# Patient Record
Sex: MALE | Race: WHITE | Employment: OTHER | URBAN - METROPOLITAN AREA
[De-identification: names, ages, dates, MRNs, and addresses within clinical notes are randomized per-mention and may not be internally consistent; named-entity substitution may affect disease eponyms.]

---

## 2021-07-23 ENCOUNTER — HOSPITAL ENCOUNTER (OUTPATIENT)
Age: 76
Setting detail: OBSERVATION
Discharge: HOME | End: 2021-07-24
Attending: EMERGENCY MEDICINE | Admitting: STUDENT IN AN ORGANIZED HEALTH CARE EDUCATION/TRAINING PROGRAM
Payer: MEDICARE

## 2021-07-23 ENCOUNTER — APPOINTMENT (OUTPATIENT)
Dept: GENERAL RADIOLOGY | Age: 76
End: 2021-07-23
Attending: STUDENT IN AN ORGANIZED HEALTH CARE EDUCATION/TRAINING PROGRAM
Payer: MEDICARE

## 2021-07-23 ENCOUNTER — APPOINTMENT (OUTPATIENT)
Dept: NON INVASIVE DIAGNOSTICS | Age: 76
End: 2021-07-23
Attending: STUDENT IN AN ORGANIZED HEALTH CARE EDUCATION/TRAINING PROGRAM
Payer: MEDICARE

## 2021-07-23 DIAGNOSIS — I21.09 ST ELEVATION MYOCARDIAL INFARCTION (STEMI) OF ANTERIOR WALL (HCC): Primary | ICD-10-CM

## 2021-07-23 DIAGNOSIS — I24.9 ACUTE CORONARY SYNDROME (HCC): ICD-10-CM

## 2021-07-23 DIAGNOSIS — I21.3 STEMI (ST ELEVATION MYOCARDIAL INFARCTION) (HCC): ICD-10-CM

## 2021-07-23 LAB
ACT BLD: 230 SECS (ref 79–138)
ACT BLD: 241 SECS (ref 79–138)
ACT BLD: 290 SECS (ref 79–138)
ACT BLD: 329 SECS (ref 79–138)
ALBUMIN SERPL-MCNC: 3.9 G/DL (ref 3.5–5)
ALBUMIN/GLOB SERPL: 1.3 {RATIO} (ref 1.1–2.2)
ALP SERPL-CCNC: 52 U/L (ref 45–117)
ALT SERPL-CCNC: 41 U/L (ref 12–78)
ANION GAP SERPL CALC-SCNC: 6 MMOL/L (ref 5–15)
AST SERPL-CCNC: 31 U/L (ref 15–37)
BASOPHILS # BLD: 0.1 K/UL (ref 0–0.1)
BASOPHILS NFR BLD: 1 % (ref 0–1)
BILIRUB DIRECT SERPL-MCNC: 0.1 MG/DL (ref 0–0.2)
BILIRUB SERPL-MCNC: 0.5 MG/DL (ref 0.2–1)
BUN SERPL-MCNC: 15 MG/DL (ref 6–20)
BUN/CREAT SERPL: 17 (ref 12–20)
CALCIUM SERPL-MCNC: 8.7 MG/DL (ref 8.5–10.1)
CHLORIDE SERPL-SCNC: 107 MMOL/L (ref 97–108)
CHOLEST SERPL-MCNC: 189 MG/DL
CO2 SERPL-SCNC: 27 MMOL/L (ref 21–32)
COMMENT, HOLDF: NORMAL
COMMENT, HOLDF: NORMAL
CREAT SERPL-MCNC: 0.86 MG/DL (ref 0.7–1.3)
DIFFERENTIAL METHOD BLD: ABNORMAL
ECHO AO ROOT DIAM: 3.77 CM
ECHO AR MAX VEL PISA: 293.16 CENTIMETER/SECOND
ECHO AV AREA PEAK VELOCITY: 2.15 CM2
ECHO AV AREA/BSA PEAK VELOCITY: 1 CM2/M2
ECHO AV PEAK GRADIENT: 8.24 MMHG
ECHO AV PEAK VELOCITY: 143.53 CM/S
ECHO AV REGURGITANT PHT: 658.7 MS
ECHO EST RA PRESSURE: 3 MMHG
ECHO LA AREA 4C: 15.19 CM2
ECHO LA MAJOR AXIS: 2.94 CM
ECHO LA MINOR AXIS: 1.43 CM
ECHO LA VOL 2C: 50.83 ML (ref 18–58)
ECHO LA VOL 4C: 34.17 ML (ref 18–58)
ECHO LA VOL BP: 45.4 ML (ref 18–58)
ECHO LA VOL/BSA BIPLANE: 22.04 ML/M2 (ref 16–28)
ECHO LA VOLUME INDEX A2C: 24.67 ML/M2 (ref 16–28)
ECHO LA VOLUME INDEX A4C: 16.59 ML/M2 (ref 16–28)
ECHO LV E' LATERAL VELOCITY: 8.78 CENTIMETER/SECOND
ECHO LV E' SEPTAL VELOCITY: 6.49 CENTIMETER/SECOND
ECHO LV INTERNAL DIMENSION DIASTOLIC: 4.72 CM (ref 4.2–5.9)
ECHO LV INTERNAL DIMENSION SYSTOLIC: 3.6 CM
ECHO LV IVSD: 1.02 CM (ref 0.6–1)
ECHO LV MASS 2D: 155.6 G (ref 88–224)
ECHO LV MASS INDEX 2D: 75.5 G/M2 (ref 49–115)
ECHO LV POSTERIOR WALL DIASTOLIC: 0.89 CM (ref 0.6–1)
ECHO LVOT DIAM: 2.08 CM
ECHO LVOT PEAK GRADIENT: 3.34 MMHG
ECHO LVOT PEAK VELOCITY: 91.33 CM/S
ECHO MV A VELOCITY: 64.07 CENTIMETER/SECOND
ECHO MV AREA PHT: 2.37 CM2
ECHO MV E DECELERATION TIME (DT): 320.56 MS
ECHO MV E VELOCITY: 56.94 CENTIMETER/SECOND
ECHO MV PRESSURE HALF TIME (PHT): 92.96 MS
ECHO PV PEAK INSTANTANEOUS GRADIENT SYSTOLIC: 1.55 MMHG
ECHO RIGHT VENTRICULAR SYSTOLIC PRESSURE (RVSP): 32.53 MMHG
ECHO RV INTERNAL DIMENSION: 3.84 CM
ECHO RV TAPSE: 1.68 CM (ref 1.5–2)
ECHO TV REGURGITANT MAX VELOCITY: 271.72 CM/S
ECHO TV REGURGITANT PEAK GRADIENT: 29.53 MMHG
EOSINOPHIL # BLD: 0.1 K/UL (ref 0–0.4)
EOSINOPHIL NFR BLD: 1 % (ref 0–7)
ERYTHROCYTE [DISTWIDTH] IN BLOOD BY AUTOMATED COUNT: 13 % (ref 11.5–14.5)
EST. AVERAGE GLUCOSE BLD GHB EST-MCNC: 126 MG/DL
GLOBULIN SER CALC-MCNC: 3 G/DL (ref 2–4)
GLUCOSE SERPL-MCNC: 124 MG/DL (ref 65–100)
HBA1C MFR BLD: 6 % (ref 4–5.6)
HCT VFR BLD AUTO: 45.6 % (ref 36.6–50.3)
HDLC SERPL-MCNC: 54 MG/DL
HDLC SERPL: 3.5 {RATIO} (ref 0–5)
HGB BLD-MCNC: 15.1 G/DL (ref 12.1–17)
IMM GRANULOCYTES # BLD AUTO: 0 K/UL (ref 0–0.04)
IMM GRANULOCYTES NFR BLD AUTO: 0 % (ref 0–0.5)
INR PPP: 1.1 (ref 0.9–1.1)
LA VOL DISK BP: 42.79 ML (ref 18–58)
LDLC SERPL CALC-MCNC: 124 MG/DL (ref 0–100)
LYMPHOCYTES # BLD: 0.8 K/UL (ref 0.8–3.5)
LYMPHOCYTES NFR BLD: 10 % (ref 12–49)
MCH RBC QN AUTO: 30 PG (ref 26–34)
MCHC RBC AUTO-ENTMCNC: 33.1 G/DL (ref 30–36.5)
MCV RBC AUTO: 90.7 FL (ref 80–99)
MONOCYTES # BLD: 0.4 K/UL (ref 0–1)
MONOCYTES NFR BLD: 5 % (ref 5–13)
NEUTS SEG # BLD: 6.1 K/UL (ref 1.8–8)
NEUTS SEG NFR BLD: 83 % (ref 32–75)
NRBC # BLD: 0 K/UL (ref 0–0.01)
NRBC BLD-RTO: 0 PER 100 WBC
PLATELET # BLD AUTO: 199 K/UL (ref 150–400)
PMV BLD AUTO: 9.7 FL (ref 8.9–12.9)
POTASSIUM SERPL-SCNC: 3.7 MMOL/L (ref 3.5–5.1)
PROT SERPL-MCNC: 6.9 G/DL (ref 6.4–8.2)
PROTHROMBIN TIME: 11.4 SEC (ref 9–11.1)
RBC # BLD AUTO: 5.03 M/UL (ref 4.1–5.7)
RBC MORPH BLD: ABNORMAL
SAMPLES BEING HELD,HOLD: NORMAL
SAMPLES BEING HELD,HOLD: NORMAL
SODIUM SERPL-SCNC: 140 MMOL/L (ref 136–145)
TRIGL SERPL-MCNC: 55 MG/DL (ref ?–150)
TROPONIN I BLD-MCNC: 0.2 NG/ML (ref 0–0.08)
TROPONIN I SERPL-MCNC: 1.98 NG/ML
TSH SERPL DL<=0.05 MIU/L-ACNC: 2.5 UIU/ML (ref 0.36–3.74)
VLDLC SERPL CALC-MCNC: 11 MG/DL
WBC # BLD AUTO: 7.5 K/UL (ref 4.1–11.1)

## 2021-07-23 PROCEDURE — 80048 BASIC METABOLIC PNL TOTAL CA: CPT

## 2021-07-23 PROCEDURE — 92941 PRQ TRLML REVSC TOT OCCL AMI: CPT | Performed by: STUDENT IN AN ORGANIZED HEALTH CARE EDUCATION/TRAINING PROGRAM

## 2021-07-23 PROCEDURE — 93005 ELECTROCARDIOGRAM TRACING: CPT

## 2021-07-23 PROCEDURE — 74011000250 HC RX REV CODE- 250: Performed by: STUDENT IN AN ORGANIZED HEALTH CARE EDUCATION/TRAINING PROGRAM

## 2021-07-23 PROCEDURE — C1874 STENT, COATED/COV W/DEL SYS: HCPCS | Performed by: STUDENT IN AN ORGANIZED HEALTH CARE EDUCATION/TRAINING PROGRAM

## 2021-07-23 PROCEDURE — 92928 PRQ TCAT PLMT NTRAC ST 1 LES: CPT | Performed by: STUDENT IN AN ORGANIZED HEALTH CARE EDUCATION/TRAINING PROGRAM

## 2021-07-23 PROCEDURE — 85347 COAGULATION TIME ACTIVATED: CPT | Performed by: STUDENT IN AN ORGANIZED HEALTH CARE EDUCATION/TRAINING PROGRAM

## 2021-07-23 PROCEDURE — 92978 ENDOLUMINL IVUS OCT C 1ST: CPT | Performed by: STUDENT IN AN ORGANIZED HEALTH CARE EDUCATION/TRAINING PROGRAM

## 2021-07-23 PROCEDURE — C1725 CATH, TRANSLUMIN NON-LASER: HCPCS | Performed by: STUDENT IN AN ORGANIZED HEALTH CARE EDUCATION/TRAINING PROGRAM

## 2021-07-23 PROCEDURE — C1887 CATHETER, GUIDING: HCPCS | Performed by: STUDENT IN AN ORGANIZED HEALTH CARE EDUCATION/TRAINING PROGRAM

## 2021-07-23 PROCEDURE — 99218 HC RM OBSERVATION: CPT

## 2021-07-23 PROCEDURE — 77030018729 HC ELECTRD DEFIB PAD CARD -B: Performed by: STUDENT IN AN ORGANIZED HEALTH CARE EDUCATION/TRAINING PROGRAM

## 2021-07-23 PROCEDURE — 93458 L HRT ARTERY/VENTRICLE ANGIO: CPT | Performed by: STUDENT IN AN ORGANIZED HEALTH CARE EDUCATION/TRAINING PROGRAM

## 2021-07-23 PROCEDURE — C1769 GUIDE WIRE: HCPCS | Performed by: STUDENT IN AN ORGANIZED HEALTH CARE EDUCATION/TRAINING PROGRAM

## 2021-07-23 PROCEDURE — 71045 X-RAY EXAM CHEST 1 VIEW: CPT

## 2021-07-23 PROCEDURE — 99284 EMERGENCY DEPT VISIT MOD MDM: CPT

## 2021-07-23 PROCEDURE — 80076 HEPATIC FUNCTION PANEL: CPT

## 2021-07-23 PROCEDURE — 74011250637 HC RX REV CODE- 250/637: Performed by: STUDENT IN AN ORGANIZED HEALTH CARE EDUCATION/TRAINING PROGRAM

## 2021-07-23 PROCEDURE — 85610 PROTHROMBIN TIME: CPT

## 2021-07-23 PROCEDURE — 83036 HEMOGLOBIN GLYCOSYLATED A1C: CPT

## 2021-07-23 PROCEDURE — 77030012468 HC VLV BLEEDBK CNTRL ABBT -B: Performed by: STUDENT IN AN ORGANIZED HEALTH CARE EDUCATION/TRAINING PROGRAM

## 2021-07-23 PROCEDURE — 77030019569 HC BND COMPR RAD TERU -B: Performed by: STUDENT IN AN ORGANIZED HEALTH CARE EDUCATION/TRAINING PROGRAM

## 2021-07-23 PROCEDURE — 77030013715 HC INFL SYS MRTM -B: Performed by: STUDENT IN AN ORGANIZED HEALTH CARE EDUCATION/TRAINING PROGRAM

## 2021-07-23 PROCEDURE — 74011250636 HC RX REV CODE- 250/636: Performed by: STUDENT IN AN ORGANIZED HEALTH CARE EDUCATION/TRAINING PROGRAM

## 2021-07-23 PROCEDURE — 85025 COMPLETE CBC W/AUTO DIFF WBC: CPT

## 2021-07-23 PROCEDURE — 85347 COAGULATION TIME ACTIVATED: CPT

## 2021-07-23 PROCEDURE — 84484 ASSAY OF TROPONIN QUANT: CPT

## 2021-07-23 PROCEDURE — 36415 COLL VENOUS BLD VENIPUNCTURE: CPT

## 2021-07-23 PROCEDURE — 80061 LIPID PANEL: CPT

## 2021-07-23 PROCEDURE — C1894 INTRO/SHEATH, NON-LASER: HCPCS | Performed by: STUDENT IN AN ORGANIZED HEALTH CARE EDUCATION/TRAINING PROGRAM

## 2021-07-23 PROCEDURE — 92979 ENDOLUMINL IVUS OCT C EA: CPT | Performed by: STUDENT IN AN ORGANIZED HEALTH CARE EDUCATION/TRAINING PROGRAM

## 2021-07-23 PROCEDURE — 93306 TTE W/DOPPLER COMPLETE: CPT

## 2021-07-23 PROCEDURE — 84443 ASSAY THYROID STIM HORMONE: CPT

## 2021-07-23 PROCEDURE — 99220 PR INITIAL OBSERVATION CARE/DAY 70 MINUTES: CPT | Performed by: STUDENT IN AN ORGANIZED HEALTH CARE EDUCATION/TRAINING PROGRAM

## 2021-07-23 PROCEDURE — 93306 TTE W/DOPPLER COMPLETE: CPT | Performed by: STUDENT IN AN ORGANIZED HEALTH CARE EDUCATION/TRAINING PROGRAM

## 2021-07-23 PROCEDURE — 74011000636 HC RX REV CODE- 636: Performed by: STUDENT IN AN ORGANIZED HEALTH CARE EDUCATION/TRAINING PROGRAM

## 2021-07-23 PROCEDURE — C1753 CATH, INTRAVAS ULTRASOUND: HCPCS | Performed by: STUDENT IN AN ORGANIZED HEALTH CARE EDUCATION/TRAINING PROGRAM

## 2021-07-23 DEVICE — XIENCE SIERRA™ EVEROLIMUS ELUTING CORONARY STENT SYSTEM 3.50 MM X 23 MM / RAPID-EXCHANGE
Type: IMPLANTABLE DEVICE | Status: FUNCTIONAL
Brand: XIENCE SIERRA™

## 2021-07-23 DEVICE — XIENCE SIERRA™ EVEROLIMUS ELUTING CORONARY STENT SYSTEM 3.00 MM X 23 MM / RAPID-EXCHANGE
Type: IMPLANTABLE DEVICE | Status: FUNCTIONAL
Brand: XIENCE SIERRA™

## 2021-07-23 RX ORDER — LIDOCAINE HYDROCHLORIDE 10 MG/ML
INJECTION INFILTRATION; PERINEURAL AS NEEDED
Status: DISCONTINUED | OUTPATIENT
Start: 2021-07-23 | End: 2021-07-23 | Stop reason: HOSPADM

## 2021-07-23 RX ORDER — METOPROLOL SUCCINATE 25 MG/1
25 TABLET, EXTENDED RELEASE ORAL DAILY
Status: DISCONTINUED | OUTPATIENT
Start: 2021-07-23 | End: 2021-07-24 | Stop reason: HOSPADM

## 2021-07-23 RX ORDER — LISINOPRIL 5 MG/1
5 TABLET ORAL DAILY
Status: DISCONTINUED | OUTPATIENT
Start: 2021-07-23 | End: 2021-07-24 | Stop reason: HOSPADM

## 2021-07-23 RX ORDER — SODIUM CHLORIDE 0.9 % (FLUSH) 0.9 %
5-40 SYRINGE (ML) INJECTION EVERY 8 HOURS
Status: DISCONTINUED | OUTPATIENT
Start: 2021-07-23 | End: 2021-07-24 | Stop reason: HOSPADM

## 2021-07-23 RX ORDER — SODIUM CHLORIDE 0.9 % (FLUSH) 0.9 %
5-40 SYRINGE (ML) INJECTION AS NEEDED
Status: DISCONTINUED | OUTPATIENT
Start: 2021-07-23 | End: 2021-07-24 | Stop reason: HOSPADM

## 2021-07-23 RX ORDER — LISINOPRIL 5 MG/1
5 TABLET ORAL DAILY
Status: DISCONTINUED | OUTPATIENT
Start: 2021-07-24 | End: 2021-07-23

## 2021-07-23 RX ORDER — VERAPAMIL HYDROCHLORIDE 2.5 MG/ML
INJECTION, SOLUTION INTRAVENOUS AS NEEDED
Status: DISCONTINUED | OUTPATIENT
Start: 2021-07-23 | End: 2021-07-23 | Stop reason: HOSPADM

## 2021-07-23 RX ORDER — ATORVASTATIN CALCIUM 80 MG/1
80 TABLET, FILM COATED ORAL
Qty: 30 TABLET | Refills: 1 | Status: SHIPPED | OUTPATIENT
Start: 2021-07-23 | End: 2021-07-24 | Stop reason: SDUPTHER

## 2021-07-23 RX ORDER — METOPROLOL SUCCINATE 25 MG/1
25 TABLET, EXTENDED RELEASE ORAL DAILY
Qty: 30 TABLET | Refills: 1 | Status: SHIPPED | OUTPATIENT
Start: 2021-07-24 | End: 2021-07-24 | Stop reason: SDUPTHER

## 2021-07-23 RX ORDER — GUAIFENESIN 100 MG/5ML
81 LIQUID (ML) ORAL DAILY
Status: DISCONTINUED | OUTPATIENT
Start: 2021-07-24 | End: 2021-07-24 | Stop reason: HOSPADM

## 2021-07-23 RX ORDER — HEPARIN SODIUM 200 [USP'U]/100ML
INJECTION, SOLUTION INTRAVENOUS
Status: COMPLETED | OUTPATIENT
Start: 2021-07-23 | End: 2021-07-23

## 2021-07-23 RX ORDER — HEPARIN SODIUM 1000 [USP'U]/ML
INJECTION, SOLUTION INTRAVENOUS; SUBCUTANEOUS AS NEEDED
Status: DISCONTINUED | OUTPATIENT
Start: 2021-07-23 | End: 2021-07-23 | Stop reason: HOSPADM

## 2021-07-23 RX ORDER — ATORVASTATIN CALCIUM 20 MG/1
80 TABLET, FILM COATED ORAL
Status: DISCONTINUED | OUTPATIENT
Start: 2021-07-23 | End: 2021-07-24 | Stop reason: HOSPADM

## 2021-07-23 RX ORDER — LISINOPRIL 5 MG/1
5 TABLET ORAL DAILY
Qty: 30 TABLET | Refills: 1 | Status: SHIPPED | OUTPATIENT
Start: 2021-07-24 | End: 2021-07-24 | Stop reason: SDUPTHER

## 2021-07-23 RX ORDER — METOPROLOL SUCCINATE 25 MG/1
25 TABLET, EXTENDED RELEASE ORAL DAILY
Status: DISCONTINUED | OUTPATIENT
Start: 2021-07-24 | End: 2021-07-23

## 2021-07-23 RX ORDER — GUAIFENESIN 100 MG/5ML
81 LIQUID (ML) ORAL DAILY
Qty: 1 TABLET | Refills: 1 | Status: SHIPPED
Start: 2021-07-24

## 2021-07-23 RX ORDER — ENOXAPARIN SODIUM 100 MG/ML
40 INJECTION SUBCUTANEOUS EVERY 24 HOURS
Status: DISCONTINUED | OUTPATIENT
Start: 2021-07-24 | End: 2021-07-24 | Stop reason: HOSPADM

## 2021-07-23 RX ADMIN — ATORVASTATIN CALCIUM 80 MG: 20 TABLET, FILM COATED ORAL at 21:03

## 2021-07-23 RX ADMIN — Medication 10 ML: at 13:41

## 2021-07-23 RX ADMIN — Medication 10 ML: at 21:04

## 2021-07-23 RX ADMIN — METOPROLOL SUCCINATE 25 MG: 25 TABLET, EXTENDED RELEASE ORAL at 21:03

## 2021-07-23 RX ADMIN — TICAGRELOR 90 MG: 90 TABLET ORAL at 21:03

## 2021-07-23 RX ADMIN — LISINOPRIL 5 MG: 5 TABLET ORAL at 21:03

## 2021-07-23 NOTE — Clinical Note
Lesion located in the Mid Cx. Balloon inserted. Balloon inflated using multiple inflation technique.

## 2021-07-23 NOTE — Clinical Note
Lesion located in the Mid LAD. Balloon inserted. Balloon inflated using multiple inflation technique. Lesion #1: Pressure = 12 fadi; Duration = 20 sec.

## 2021-07-23 NOTE — Clinical Note
Lesion located in the Mid Cx. Balloon inserted. Balloon inflated using multiple inflation technique. Lesion #1: Pressure = 16 fadi; Duration = 13 sec. Inflation 2: Pressure = 16 fadi; Duration = 14 sec. Inflation 3: Pressure = 18 fadi; Duration = 13 sec. Inflation 4: Pressure = 18 fadi; Duration = 15 sec. Inflation 5: Pressure = 20 fadi; Duration = 20 sec.

## 2021-07-23 NOTE — ED TRIAGE NOTES
Patient arrives via EMS with c/o chest tightness while doing yardwork at 0940 this AM. Patient is AO X 4. Patient was given 324 asa and 0.4 nitro PTA. No hx of cardiac events. Cath lab at bedside at this time.

## 2021-07-23 NOTE — PROGRESS NOTES
Bedside shift change report given to Izabela Koehler RN (oncoming nurse) by Mayra Olguin RN (offgoing nurse). Report included the following information SBAR, ED Summary, Intake/Output, MAR, Recent Results, Cardiac Rhythm NSR and Quality Measures. Dual assessment of TR band completed with offgoing RN- small hematoma noted above band with some oozing of bright red blood around site. TR band inflated back to 11mL (same amount as directly post cath). Will attempt to withdraw air again starting after 1600.     1800- TR band removed, small hematoma present above band, unchanged from prior assessments. Quickclot dressing applied. 1815- Quickclot dressing to right wrist clean, dry and intact. 1900- Bedside shift change report given to Harinder Knott RN (oncoming nurse) by Arpit Smith RN (offgoing nurse). Report included the following information SBAR, ED Summary, Intake/Output, MAR, Recent Results, Cardiac Rhythm NSR and Quality Measures.

## 2021-07-23 NOTE — H&P
Maria Dolores Poole DO  [facility] [address] , [address] [facility], [id number] [place] [address]                                       Office [phone],[facility] [phone]    Cardiology Admission Note      2021 10:21 AM  :  1945   MRN:  [id number]         Subjective:   STEMI (ST elevation myocardial infarction) [facility]) [I21.3]    Bony Contreras is a 77 y.o. male without significant past medical history presents to the ED with approximately 1 hour of chest pain symptoms. He reports he was out doing yard work when he developed acute onset of substernal chest tightness. Onset of symptoms approximately 1 hour prior to arrival in the ED. Chest pain symptoms ease in route with heparin and nitroglycerin. EKG in the ED showed inferior Q waves with subtle ST changes in the inferior leads. Due to his ongoing chest discomfort emergent heart catheterization was pursued. Allergies   Allergen Reactions    Keflex [Cephalexin] Rash         No past medical history on file. No past surgical history on file.     Home Medications:  None       Hospital Medications:  Current Facility-Administered Medications   Medication Dose Route Frequency    sodium chloride (NS) flush 5-40 mL  5-40 mL IntraVENous Q8H    sodium chloride (NS) flush 5-40 mL  5-40 mL IntraVENous PRN    sodium chloride (NS) flush 5-40 mL  5-40 mL IntraVENous Q8H    sodium chloride (NS) flush 5-40 mL  5-40 mL IntraVENous PRN    [START ON 2021] metoprolol succinate (TOPROL-XL) XL tablet 25 mg  25 mg Oral DAILY    [START ON 2021] aspirin chewable tablet 81 mg  81 mg Oral DAILY    ticagrelor (BRILINTA) tablet 90 mg  90 mg Oral Q12H    [START ON 2021] lisinopriL (PRINIVIL, ZESTRIL) tablet 5 mg  5 mg Oral DAILY    atorvastatin (LIPITOR) tablet 80 mg  80 mg Oral QHS    enoxaparin (LOVENOX) injection 40 mg  40 mg SubCUTAneous Q24H       None          Social History:  Social History     Tobacco Use    Smoking status: Not on file   Substance Use Topics    Alcohol use: Not on file       Family History:  No family history on file. Review of Symptoms:  Pertinent Positive: Chest pain, mild shortness of breath  Pertinent Negative: No history of GI bleeding  All Other systems reviewed and are negative for a Comprehensive ROS (10+)    Blood pressure (!) 161/78, pulse 62, resp. rate 21, height 5' 11\" (1.803 m), weight 190 lb (86.2 kg), SpO2 98 %. Constitutional:  well-developed and well-nourished. No distress. HENT: Normocephalic. Eyes: No scleral icterus. Neck:  Neck supple. No JVD present. Pulmonary/Chest: Effort normal and breath sounds normal. No respiratory distress, wheezes or rales. Cardiovascular: Normal rate, regular rhythm, S1 S2 . Exam reveals no gallop and no friction rub. No murmur heard. No edema. Extremities:  Normal muscle tone. Abdominal:   No abnormal distension. Neurological:  Alert and oriented. Coordination seems grossly normal.   Skin: Skin is not cold. No rash noted. Not diaphoretic. No erythema. Psychiatric:  Grossly normal mood and affect. Behavior appears normal.       Cardiac Testing/ Procedures:  EKG:                          Assessment/Plan:     Patient Active Problem List   Diagnosis Code    STEMI (ST elevation myocardial infarction) ([facility] [address] I21.3         Acute coronary syndromedue to ongoing chest pain symptoms emergent heart catheterization was pursued. Risk and benefit of cardiac catheterization/PCI was described in detail to patient.  (risk of vascular access complication with potential surgical intervention for management, stroke, myocardial infarction, emergent open heart surgery and death). Patient wishes to proceed with coronary angiography with possible intervention. Cardiac cath showed  JAQUI II flow into distal LAD with JAQUI-3 flow in circumflex and RCA. .  Critical stenosis within mid LAD with a hazy 90% stenosis, AV groove circumflex with a 70 to 80% stenosis with mild to moderate disease in the distal obtuse marginals, ostial right coronary artery with a 60% stenosis. Status post PCI of mid LAD and proximal and the mid circumflex. Echocardiogram is pending. Initiate goal-directed medical therapy with low-dose metoprolol and lisinopril therapy. Brilinta based DAPT, optimally for 1 year. High-dose statin therapy with atorvastatin 80 mg daily, adjust based on lipid panel. Outpatient cardiac rehab. Advised patient and his wife to decrease physical activity for approximately 14 days, after exercising for a few days in cardiac rehab. I have discussed the diagnosis with the patient and the intended plan as seen in the above orders. Questions were answered concerning future plans. Lesli Guzmán is in agreement to the plan listed above and wishes to proceed.               Holli Valle, DO

## 2021-07-23 NOTE — PROGRESS NOTES
Reason for Admission:  Chest tightness ,substernal CP,STEMI                    RUR Score:      6% low risk for future readmission    OBSERVATION STATUS           Plan for utilizing home health:      No home health needs identified    PCP: First and Last name:  Dr Jacek Evans     Name of Practice:    Are you a current patient: Yes/No: yes   Approximate date of last visit:    Can you participate in a virtual visit with your PCP: yes                    Current Advanced Directive/Advance Care Plan: full code      Healthcare Decision Maker:            Shreya Cancino @ [phone]                    Transition of Care Plan:                    Pt was admitted with a STEMI. I have contacted pt registration to please come complete pt.'s registration as none of that was completed due to pt.'s emergent admisson and going to the cath lab due to STEMI. Pt lives with his wife in a one story home in [place] with 5 entry steps. Prior to admission,pt was independent in all aspects . There are no identified home health,rehab or DME needs. Pt states he was recently seen by Dr Russell Ritchie three months ago and does not want to see hm again until his next physical as I offered to make a follow-up PCP appointment for him. Pt does have a follow-up cardiology appointment with Dr Vesna Middleton on 7/29/21 @ 4 pm.    Pharmacy of choice is [facility]. Noted:Pt was started on brilinta. I gave pt the coupon to receive his first month for free . Hopefully,his co-pay after that will be 45$ or less. I encouraged pt to inform his cardiologist if his co-pay is not affordable or ask for samples or ask to be enrolled in the pt assistance program.    Per pt,he may be discharged home tomorrow. Observation notice provided in writing to patient and/or caregiver as well as verbal explanation of the policy. Patients who are in outpatient status also receive the Observation notice.   Patient has received notice and or patient representative has received via secure email, fax, or certified mail based on patient representative's preference.      Brandon Medley

## 2021-07-23 NOTE — Clinical Note
Lesion located in the Mid Cx. Balloon inserted. Balloon inflated using multiple inflation technique. Lesion #1: Pressure = 10 fadi; Duration = 19 sec.

## 2021-07-23 NOTE — PROGRESS NOTES
Spiritual Care Assessment/Progress Note  [address]      NAME: Reshma Fisher      MRN: [id number]  AGE: 77 y.o. SEX: male  Mormonism Affiliation:    Language:      7/23/2021     Total Time (in minutes): 22     Spiritual Assessment begun in [facility] EMERGENCY DEPT through conversation with:         [x]Patient        [] Family    [] Friend(s)        Reason for Consult: Crisis, Stemi     Spiritual beliefs: (Please include comment if needed)     [] Identifies with a saran tradition:         [] Supported by a saran community:            [] Claims no spiritual orientation:           [] Seeking spiritual identity:                [] Adheres to an individual form of spirituality:           [x] Not able to assess:                           Identified resources for coping:      [] Prayer                               [] Music                  [] Guided Imagery     [] Family/friends                 [] Pet visits     [] Devotional reading                         [x] Unknown     [] Other:                                               Interventions offered during this visit: (See comments for more details)    Patient Interventions: Crisis, Stemi           Plan of Care:     [] Support spiritual and/or cultural needs    [] Support AMD and/or advance care planning process      [] Support grieving process   [] Coordinate Rites and/or Rituals    [] Coordination with community clergy   [] No spiritual needs identified at this time   [] Detailed Plan of Care below (See Comments)  [] Make referral to Music Therapy  [] Make referral to Pet Therapy     [] Make referral to Addiction services  [] Make referral to [facility]  [] Make referral to Spiritual Care Partner  [] No future visits requested        [x] Follow up upon further referrals     Comments:   received a page for Code Stemi to Emergency Department (ED). Patient arrived by Ambulance and was awake, alert but no family present.  Medical team began to work with patient.  advised staff to contact [facility] for any further needs.     Chaplain Denis Al M.Div.  Stacie Joshi ([phone])

## 2021-07-23 NOTE — ED PROVIDER NOTES
The history is provided by the patient, the spouse and the EMS personnel. Chest Pain   This is a new problem. The current episode started 1 to 2 hours ago. The problem has been gradually improving. The problem occurs constantly. The pain is associated with exertion. The pain is present in the substernal region. The pain is mild. The quality of the pain is described as tightness. The pain does not radiate. Associated symptoms include diaphoresis and shortness of breath. Pertinent negatives include no abdominal pain, no back pain, no claudication, no cough, no dizziness, no exertional chest pressure, no fever, no headaches, no hemoptysis, no irregular heartbeat, no leg pain, no lower extremity edema, no malaise/fatigue, no nausea, no near-syncope, no numbness, no orthopnea, no palpitations, no PND, no sputum production, no vomiting and no weakness. He has tried aspirin and nitroglycerin for the symptoms. The treatment provided significant relief. Risk factors include family history. His past medical history does not include aneurysm, cancer, DM, DVT, HTN, PE or CHF. Procedural history includes exercise treadmill test.       No past medical history on file. No past surgical history on file. No family history on file.     Social History     Socioeconomic History    Marital status: Not on file     Spouse name: Not on file    Number of children: Not on file    Years of education: Not on file    Highest education level: Not on file   Occupational History    Not on file   Tobacco Use    Smoking status: Not on file   Substance and Sexual Activity    Alcohol use: Not on file    Drug use: Not on file    Sexual activity: Not on file   Other Topics Concern    Not on file   Social History Narrative    Not on file     Social Determinants of Health     Financial Resource Strain:     Difficulty of Paying Living Expenses:    Food Insecurity:     Worried About Running Out of Food in the Last Year:     Kita yadav Food in the Last Year:    Transportation Needs:     Lack of Transportation (Medical):  Lack of Transportation (Non-Medical):    Physical Activity:     Days of Exercise per Week:     Minutes of Exercise per Session:    Stress:     Feeling of Stress :    Social Connections:     Frequency of Communication with Friends and Family:     Frequency of Social Gatherings with Friends and Family:     Attends Evangelical Services:     Active Member of Clubs or Organizations:     Attends Club or Organization Meetings:     Marital Status:    Intimate Partner Violence:     Fear of Current or Ex-Partner:     Emotionally Abused:     Physically Abused:     Sexually Abused: ALLERGIES: Keflex [cephalexin]    Review of Systems   Constitutional: Positive for diaphoresis. Negative for activity change, chills, fever and malaise/fatigue. HENT: Negative for nosebleeds, sore throat, trouble swallowing and voice change. Eyes: Negative for visual disturbance. Respiratory: Positive for shortness of breath. Negative for cough, hemoptysis and sputum production. Cardiovascular: Positive for chest pain. Negative for palpitations, orthopnea, claudication, PND and near-syncope. Gastrointestinal: Negative for abdominal pain, constipation, diarrhea, nausea and vomiting. Genitourinary: Negative for difficulty urinating, dysuria, hematuria and urgency. Musculoskeletal: Negative for back pain, neck pain and neck stiffness. Skin: Negative for color change. Allergic/Immunologic: Negative for immunocompromised state. Neurological: Negative for dizziness, seizures, syncope, weakness, light-headedness, numbness and headaches. Psychiatric/Behavioral: Negative for behavioral problems, confusion, hallucinations, self-injury and suicidal ideas. Vitals:    07/23/21 1024   BP: (!) 161/78   Pulse: 62   Resp: 21   SpO2: 98%            Physical Exam  Vitals and nursing note reviewed.    Constitutional:       General: He is not in acute distress. Appearance: He is well-developed. He is not diaphoretic. HENT:      Head: Normocephalic and atraumatic. Eyes:      Pupils: Pupils are equal, round, and reactive to light. Cardiovascular:      Rate and Rhythm: Normal rate and regular rhythm. Heart sounds: Normal heart sounds. No murmur heard. No friction rub. No gallop. Pulmonary:      Effort: Pulmonary effort is normal. No respiratory distress. Breath sounds: Normal breath sounds. No wheezing. Abdominal:      General: Bowel sounds are normal. There is no distension. Palpations: Abdomen is soft. Tenderness: There is no abdominal tenderness. There is no guarding or rebound. Musculoskeletal:         General: Normal range of motion. Cervical back: Normal range of motion and neck supple. Skin:     General: Skin is warm. Findings: No rash. Neurological:      Mental Status: He is alert and oriented to person, place, and time. Psychiatric:         Behavior: Behavior normal.         Thought Content: Thought content normal.         Judgment: Judgment normal.          MDM  Number of Diagnoses or Management Options  ST elevation myocardial infarction (STEMI) of anterior wall [facility]  Diagnosis management comments: Total critical care time spent exclusive of procedures:  25min       This is a 80-year-old male with past medical history, review of systems, physical exam as above, presenting via EMS for concerns of ST elevation MI. Per EMS report, patient began to experience chest \"tightness\" and shortness of breath, substernal in nature, nonradiating approximately 1 hour prior to presentation. Patient denies a history of similar symptoms. He denies a history of hypertension, diabetes, coronary artery disease, hyperlipidemia. He denies alcohol or tobacco or drug use. He endorses a strong family history of coronary artery disease. He endorses a remote history of exercise treadmill testing.   EKG by EMS with concern for ST elevations in the lateral leads. Patient received aspirin and nitroglycerin from EMS in route and now endorses tightness as 12 out of 10. Physical exam remarkable for a well-appearing elderly male, in no acute distress noted to be mildly hypertensive, without tachycardia, satting well on room air. He has clear breath sounds, regular rate and rhythm without murmurs gallops or rubs. EKG obtained at bedside concerning for mild ST changes in the inferior leads. Patient discussed with Dr. Adi Tipton (interventional cardiology), who plans to take the patient to the Cath Lab for angiography, possible stenting.     Procedures

## 2021-07-23 NOTE — PROGRESS NOTES
TRANSFER - IN REPORT:    Verbal report received from Ed(name) on Erendira Manner  being received from Cath(unit) for routine post - op      Report consisted of patients Situation, Background, Assessment and   Recommendations(SBAR). Information from the following report(s) SBAR, Kardex, ED Summary, Procedure Summary, Intake/Output, MAR, Recent Results, Cardiac Rhythm NSR and Alarm Parameters  was reviewed with the receiving nurse. Opportunity for questions and clarification was provided. Assessment completed upon patients arrival to unit and care assumed. 1245- Received patient from cath lab. AAOx4, able to make needs known. Respirations even easy unlabored. No SOB or cough noted. Tolerating room air well. Abdomen soft nontender nondistended, +BS noted. Voiding well in urinal. TR Band noted to right wrists, C/D/I. Bed locked in lowest position, call bell in reach. Pt reports taking no medications at home. Elizabeth [address] with Dr. Christine Ward that lovenox to be given in AM and Brilinta to be given tonight. 1300- No changes at this time. Family at bedside. 1325- Removed 2 cc from Right TR Band. [facility] blood per order. 1340- Went to remove an additional 2 cc, but small amount of bleeding to TR site, and replaced 2 cc. 9 cc remain. 1400- Post Cath EKG completed and placed in chart. 1430- Small hematoma noted above TR Band had not taken any additional air out prior. Pt states band tight- removed 2 cc. No further bleeding noted and encouraged patient to not use right hand. Had been using right hand previously to eat and utilize urinal.  1450- Pt states feeling good, no changes at this time. Removed 2 cc. Hematoma has not gotten larger. Encouraged to keep immobilized. 1500- No changes at this time. 1515- TR Band still having some bleeding, added back air to TR band. Report given to Alvarado Fairchild RN assuming care of patient.    Bedside and Verbal shift change report given to Dacia Hooker. LALO (oncoming nurse) by Santana Lai. RN (offgoing nurse).  Report included the following information SBAR, Kardex, ED Summary, Procedure Summary, Intake/Output, MAR, Recent Results, Cardiac Rhythm NSR and Alarm Parameters

## 2021-07-23 NOTE — Clinical Note
Lesion: Located in the Mid Cx. Stent inserted. Multiple inflations used. First inflation pressure = 12 fadi; inflation time: 30 sec.

## 2021-07-23 NOTE — PROCEDURES
BRIEF PROCEDURE NOTE    Date of Procedure: 7/23/2021   Preoperative Diagnosis: Acute coronary syndrome  Postoperative Diagnosis: Acute coronary syndrome  Procedure: Left heart cath, coronary angiography  Interventional Cardiologist: Johnna Carter DO  Assistant: None  Anesthesia: local + IV moderate sedation   I administered moderate sedation throughout this procedure. An independent trained observer pushed medications at my direction, and monitored the patients level of consciousness and physiological status throughout. Estimated Blood Loss: Minimal    Access: Right radial artery, 6F  Catheters:  Left coronary: JL 3.5, 5F  Right coronary: JR4, 5F    Findings:   L Main: Large caliber vessel, short, mild disease  LAD: Large caliber vessel, proximal mild disease, mid vessel hazy 90% flow-limiting stenosis with subsequent distal 40% stenosis, small D1, moderate D2 with diffuse mild to moderate disease  LCx: Large caliber vessel, AV groove circumflex 70 to 80% stenosis, several high small caliber obtuse marginals, OM2 with a proximal tubular 50 to 60% stenosis, OM 3 diffuse mild disease  RCA: Large-caliber vessel, ostial 60% stenosis, diffuse mild disease throughout right coronary artery, moderate distal right coronary artery disease, small PL branch and small PDA with diffuse mild disease    LVEDP:  5 mmhg    PCI:  EBU 3 5, 6F  Systemic heparinization    LAD  Jorge blue passed into distal LAD. Mid LAD dilated with 2.5 compliant balloon. IVUS used for sizing. Mid LAD just distal to D2 was stented with a 3.0 x 23 mm Xience Nandini QUINN, optimized with 3.5 noncompliant balloon at 16 to 20 fadi. IVUS showed well opposed stent. No evidence of dissection or perforation. JAQUI-3 flow into distal vessel post PCI    Circumflex  Jorge blue remained in the LAD. Run-through was passed into distal circumflex. Circumflex was dilated with 3.5 noncompliant balloon. IVUS was used for vessel sizing.   AV groove circumflex was stented with a 3.5X 23 mm Xience [address] QUINN. Stent was optimized with a 3.75 NC balloon at 16 to 20 fadi. Required stuart wire in order to pass noncompliant balloon into circumflex. No evidence of dissection or perforation. JAQUI-3 flow into distal circumflex post PCI. Specimens Removed: None    Implants: Xience Nandini QUINN x2    Closure Device: radial TR band    See full cath note. Complications: none      Findings:  1. Acute coronary syndrome with critical stenosis of mid LAD with evidence of severe stenosis of AV groove circumflex with moderate disease of the ostial right coronary artery  2. Mid LAD stented with 3.0 x 23 mm Xience Nandini QUINN optimized with 3.5 noncompliant balloon  3. AV groove circumflex stented with 3.5X 23 mm Xience Nandini QUINN, optimized with 3.75 noncompliant balloon  4.   Normal LVEDP      Plan:  Goal-directed medical therapy  Brilinta based DAPT  High-dose statin therapy  Cardiac rehab    Almas Lino,         [facility], DO  [facility] [address] [place] [address]                                              Office [phone],CDQ [phone]

## 2021-07-23 NOTE — Clinical Note
TRANSFER - OUT REPORT:     Verbal report given to: Shasha Darden . Report consisted of patient's Situation, Background, Assessment and   Recommendations(SBAR). Opportunity for questions and clarification was provided. Patient transported with a Registered Nurse.

## 2021-07-23 NOTE — Clinical Note
TRANSFER - IN REPORT:     Verbal report received from: ER nurse. Report consisted of patient's Situation, Background, Assessment and   Recommendations(SBAR). Opportunity for questions and clarification was provided. Assessment completed upon patient's arrival to unit and care assumed. Patient transported with a Registered Nurse.

## 2021-07-23 NOTE — DISCHARGE INSTRUCTIONS
Future Appointments   Date Time Provider Shaun Headley   7/29/2021  4:00 PM Yung Karen, DO [facility] BS AMB     Radial Cardiac Catheterization/Angiography Discharge Instructions   It is normal to feel tired the first couple days. Take it easy and follow the physicians instructions. CHECK THE CATHETER INSERTION SITE DAILY:   Remove the wrist dressing 24 hours after the procedure. You may shower 24 hours after the procedure. Wash with soap and water and pat dry. Gentle cleaning of the site with soap and water is sufficient, cover with a dry clean dressing or bandage. Do not apply creams or powders to the area. No soaking the wrist for 3 days. Leave the puncture site open to air after 24 hours post-procedure. CALL THE PHYSICIANS:   If the site becomes red, swollen or feels warm to the touch   If there is bleeding or drainage or if there is unusual pain at the radial site. If there is any minor oozing, you may apply a band-aid and remove after 12 hours. If the bleeding continues, hold pressure with the middle finger against the puncture site and the thumb against the back of the wrist,call 911 to be transported to the hospital.   DO NOT DRIVE YOURSELF, [facility]. ACTIVITY:   For the first 24 hours do not manipulate the wrist.   No lifting, pushing or pulling over 3-5 pounds with the affected wrist for 7 daysand no straining the insertion site. Do not life grocery bags or the garbage can, do not run the vacuum  or  for 7 days. Start with short walks as in the hospital and gradually increase as tolerated each day. It is recommended to walk 30 minutes 5-7 days per week. Follow your physicians instructions on activity. Avoid walking outside in extremes of heat or cold. Walk inside when it is cold and windy or hot and humid. Things to keep in mind:   No driving for at least 24 hours, or as designated by your physician.    Limit the number of times you go up and down the stairs   Take rests and pace yourself with activity. Be careful and do not strain with bowel movements. MEDICATIONS:   Take all medications as prescribed   Call your physician if you have any questions   Keep an updated list of your medications with you at all times and give a list to your physician and pharmacist   SIGNS AND SYMPTOMS:   Be cautious of symptoms of angina or recurrent symptoms such as chest discomfort, unusual shortness of breath or fatigue. These could be symptoms of restenosis, a new blockage or a heart attack. If your symptoms are relieved with rest it is still recommended that you notify your physician of recurrent chest pain or discomfort. For CHEST PAIN or symptoms of angina not relieved with rest: If the discomfort is not relieved with rest, and you have been prescribed Nitroglycerin, take as directed (taken under the tongue, one at a time 5 minutes apart for a total of 3 doses). If the discomfort is not relieved after the 3rd nitroglycerin, call 911. If you have not been prescribed Nitroglycerin and your chest discomfort is not relieved with rest, call 911. AFTER CARE:   Follow up with your physician as instructed. Follow a heart healthy diet with proper portion control, daily stress management, daily exercise, blood pressure and cholesterol control , and smoking cessation. [facility]/ED Visit Follow-Up Instructions/Information    You may have an in home follow up visit set up with [facility] or may wish to contact [facility] to set-up a visit:    What are we? [facility] is an in-home urgent care service staffed with emergency trained medical teams. We come to your home in a vehicle stocked with medical supplies and technology. An ER physician is always available if needed. When? As a part of your hospital follow-up, an appointment has been/ or can be set up for us to come see you.  Usually, this will be 24-72 hours after you leave the hospital or as needed. [facility] is open 7am-9pm, 7 days a week, 365 days a year, including holidays. Why? We know that you cannot always get to your doctor after being in the hospital and that your doctor is not always available when you need them. Once your workup is complete, we'll call in your prescriptions, update your family doctor, and handle billing with your insurance so you can focus on feeling better, faster without leaving home. How much? We accept most major health insurance plans, including Medicaid, Medicare, and Medicare Advantage [place], [address] [place] [address], [employer], and [employer]. We also accept: credit, debit, health savings account (HSA), health reimbursement account (HRA) and flexible spending account (FSA) payments. [facility] prices compare to conventional urgent care facilities, but we bring the care to you. How to reach us? Getting care is easy- use our mobile mike ([employer]), website ([email or web address]) or call us [phone].

## 2021-07-23 NOTE — Clinical Note
Lesion located in the Mid LAD. Balloon inserted. Balloon inflated using multiple inflation technique. Lesion #1: Pressure = 12 fadi; Duration = 14 sec. Inflation 2: Pressure = 18 fadi; Duration = 19 sec. Inflation 3: Pressure = 20 fadi; Duration = 23 sec.

## 2021-07-23 NOTE — CARDIO/PULMONARY
[facility] Cardiopulmonary Rehab: 78 yo male admitted with emergent STEMI. Cath per Dr Gabriella Baker with successful QUINN to LAD & LCx. Attempted to see pt in ICU- curtain closed and per staff not a good time to see patient. Will follow up for outpatient cardiac rehab eligibility.

## 2021-07-23 NOTE — Clinical Note
Lesion: Located in the Mid LAD. Stent inserted. Multiple inflations used. First inflation pressure = 16 fadi; inflation time: 32 sec.

## 2021-07-24 ENCOUNTER — DOCUMENTATION ONLY (OUTPATIENT)
Dept: INTERNAL MEDICINE | Age: 76
End: 2021-07-24

## 2021-07-24 VITALS
HEART RATE: 79 BPM | TEMPERATURE: 98.4 F | HEIGHT: 71 IN | DIASTOLIC BLOOD PRESSURE: 61 MMHG | BODY MASS INDEX: 27.44 KG/M2 | WEIGHT: 195.99 LBS | OXYGEN SATURATION: 94 % | RESPIRATION RATE: 17 BRPM | SYSTOLIC BLOOD PRESSURE: 122 MMHG

## 2021-07-24 LAB
ANION GAP SERPL CALC-SCNC: 6 MMOL/L (ref 5–15)
ATRIAL RATE: 58 BPM
ATRIAL RATE: 66 BPM
BUN SERPL-MCNC: 14 MG/DL (ref 6–20)
BUN/CREAT SERPL: 16 (ref 12–20)
CALCIUM SERPL-MCNC: 8.1 MG/DL (ref 8.5–10.1)
CALCULATED P AXIS, ECG09: 61 DEGREES
CALCULATED P AXIS, ECG09: 69 DEGREES
CALCULATED R AXIS, ECG10: -13 DEGREES
CALCULATED R AXIS, ECG10: -14 DEGREES
CALCULATED T AXIS, ECG11: 23 DEGREES
CALCULATED T AXIS, ECG11: 27 DEGREES
CHLORIDE SERPL-SCNC: 109 MMOL/L (ref 97–108)
CO2 SERPL-SCNC: 25 MMOL/L (ref 21–32)
CREAT SERPL-MCNC: 0.88 MG/DL (ref 0.7–1.3)
DIAGNOSIS, 93000: NORMAL
DIAGNOSIS, 93000: NORMAL
GLUCOSE SERPL-MCNC: 124 MG/DL (ref 65–100)
P-R INTERVAL, ECG05: 150 MS
P-R INTERVAL, ECG05: 172 MS
POTASSIUM SERPL-SCNC: 3.8 MMOL/L (ref 3.5–5.1)
Q-T INTERVAL, ECG07: 408 MS
Q-T INTERVAL, ECG07: 416 MS
QRS DURATION, ECG06: 78 MS
QRS DURATION, ECG06: 84 MS
QTC CALCULATION (BEZET), ECG08: 408 MS
QTC CALCULATION (BEZET), ECG08: 427 MS
SODIUM SERPL-SCNC: 140 MMOL/L (ref 136–145)
TROPONIN I SERPL-MCNC: 7.42 NG/ML
VENTRICULAR RATE, ECG03: 58 BPM
VENTRICULAR RATE, ECG03: 66 BPM

## 2021-07-24 PROCEDURE — 96372 THER/PROPH/DIAG INJ SC/IM: CPT

## 2021-07-24 PROCEDURE — 74011250637 HC RX REV CODE- 250/637: Performed by: STUDENT IN AN ORGANIZED HEALTH CARE EDUCATION/TRAINING PROGRAM

## 2021-07-24 PROCEDURE — 36415 COLL VENOUS BLD VENIPUNCTURE: CPT

## 2021-07-24 PROCEDURE — 74011250636 HC RX REV CODE- 250/636: Performed by: STUDENT IN AN ORGANIZED HEALTH CARE EDUCATION/TRAINING PROGRAM

## 2021-07-24 PROCEDURE — 99218 HC RM OBSERVATION: CPT

## 2021-07-24 PROCEDURE — 99217 PR OBSERVATION CARE DISCHARGE MANAGEMENT: CPT | Performed by: INTERNAL MEDICINE

## 2021-07-24 PROCEDURE — 80048 BASIC METABOLIC PNL TOTAL CA: CPT

## 2021-07-24 PROCEDURE — 84484 ASSAY OF TROPONIN QUANT: CPT

## 2021-07-24 RX ORDER — ATORVASTATIN CALCIUM 80 MG/1
80 TABLET, FILM COATED ORAL
Qty: 30 TABLET | Refills: 1 | Status: SHIPPED | OUTPATIENT
Start: 2021-07-24 | End: 2021-07-29 | Stop reason: SDUPTHER

## 2021-07-24 RX ORDER — LISINOPRIL 5 MG/1
5 TABLET ORAL DAILY
Qty: 30 TABLET | Refills: 1 | Status: SHIPPED | OUTPATIENT
Start: 2021-07-24 | End: 2021-07-29 | Stop reason: SDUPTHER

## 2021-07-24 RX ORDER — METOPROLOL SUCCINATE 25 MG/1
25 TABLET, EXTENDED RELEASE ORAL DAILY
Qty: 30 TABLET | Refills: 1 | Status: CANCELLED | OUTPATIENT
Start: 2021-07-24

## 2021-07-24 RX ORDER — LISINOPRIL 5 MG/1
5 TABLET ORAL DAILY
Qty: 30 TABLET | Refills: 1 | Status: CANCELLED | OUTPATIENT
Start: 2021-07-24

## 2021-07-24 RX ORDER — ATORVASTATIN CALCIUM 80 MG/1
80 TABLET, FILM COATED ORAL
Qty: 30 TABLET | Refills: 1 | Status: CANCELLED | OUTPATIENT
Start: 2021-07-24

## 2021-07-24 RX ORDER — METOPROLOL SUCCINATE 25 MG/1
25 TABLET, EXTENDED RELEASE ORAL DAILY
Qty: 30 TABLET | Refills: 1 | Status: SHIPPED | OUTPATIENT
Start: 2021-07-24 | End: 2021-07-29 | Stop reason: SDUPTHER

## 2021-07-24 RX ADMIN — ENOXAPARIN SODIUM 40 MG: 40 INJECTION SUBCUTANEOUS at 08:21

## 2021-07-24 RX ADMIN — Medication 10 ML: at 05:34

## 2021-07-24 RX ADMIN — ASPIRIN 81 MG: 81 TABLET, CHEWABLE ORAL at 08:21

## 2021-07-24 RX ADMIN — TICAGRELOR 90 MG: 90 TABLET ORAL at 09:42

## 2021-07-24 RX ADMIN — LISINOPRIL 5 MG: 5 TABLET ORAL at 08:21

## 2021-07-24 RX ADMIN — METOPROLOL SUCCINATE 25 MG: 25 TABLET, EXTENDED RELEASE ORAL at 08:21

## 2021-07-24 NOTE — PROGRESS NOTES
0700- Bedside and Verbal shift change report received from Inga Doherty RN (offgoing nurse) by Addison Phan RN (oncoming nurse). Report included the following information SBAR, Kardex, ED Summary, OR Summary, Procedure Summary, Intake/Output, MAR, Recent Results, Cardiac Rhythm SR and Alarm Parameters . Primary Nurse Addison Phan RN and Harinder Knott RN performed a dual skin assessment on this patient Impairment noted- see wound doc flow sheet. All drips verified. 0800- Patient shift assessment performed. Patient alert and oriented. Follows commands and responds spontaneously. Patient does not  complain of pain, nausea, vomiting, chest pain, or SOB. Pupils equal, round and reactive. Facial symmetry intact. Patient speech clear. No neuro complaints. . All pulses present and palpable in  upper extremities and  lower extremities. Skin warm, dry, and appropriate for race. Mild edema present. Abdomen soft  Bowel sounds active x4. All IVs patent, flushes well, with blood return. All pump settings verified. Patient turned and resting comfortably. Patient educated on call bell and patient safety measures. Call bell in reach, bed in low and locked position, and reminded patient to use call bell. Patient board updated and care plan discussed with patient . 0900:given due medication . Tele rounds done with intensivist .  1000:pt sitting in the [facility] . resting comfortably. Discontinued the PIVs pt going home . 1040:given dc instruction and patient and his wife verbalized understanding. postr cath instruction given . Patient transfer to his car by [facility] .  Pt A,A,O x 3  While he go

## 2021-07-24 NOTE — PROGRESS NOTES
SUKUMAR:   1) Home with family and f.u appts   2) Risk of readmission- 9% LOW- currently on obs. Hospital Day 2:   D/C Order Noted:   9:35 AM- Weekend CM reviewed chart- pt to go home with f.u appts and family assistance. CM attempted to meet with pt- pt unavailable. CM contacted pt's wife, no answer, unable to leave a VM. Previous CM provided pt with Brilinta. Pt declined for CMs to arrange PCP appt- does have an appt on 7/29 with his Cardiologist. Pt's wife to drive pt home at time of discharge and as needed if pt cannot. No further CM needs identified, RN informed. Care Management Interventions  PCP Verified by CM:  Yes  Mode of Transport at Discharge: Self  Transition of Care Consult (CM Consult): Discharge Planning  MyChart Signup: No  Discharge Durable Medical Equipment: No  Health Maintenance Reviewed: Yes  Physical Therapy Consult: No  Occupational Therapy Consult: No  Speech Therapy Consult: No  Current Support Network: Lives with Spouse  Confirm Follow Up Transport: Family  The Plan for Transition of Care is Related to the Following Treatment Goals : discharge  The Patient and/or Patient Representative was Provided with a Choice of Provider and Agrees with the Discharge Plan?: Yes  Name of the Patient Representative Who was Provided with a Choice of Provider and Agrees with the Discharge Plan: antonina but  Freedom of Choice List was Provided with Basic Dialogue that Supports the Patient's Individualized Plan of Care/Goals, Treatment Preferences and Shares the Quality Data Associated with the Providers?: Yes  Discharge Location  Discharge Placement: Home with family assistance     KEENAN Romero, [address] Finasteride Pregnancy And Lactation Text: This medication is absolutely contraindicated during pregnancy. It is unknown if it is excreted in breast milk.

## 2021-07-24 NOTE — DISCHARGE SUMMARY
Cardiac Electrophysiology Discharge Summary       Patient ID:  Gay Bourne  [id number]  50 y.o.  1945    Admit Date: 7/23/2021    Discharge Date: 7/24/2021     Admitting Physician: Yamile Johansen DO     Discharge Physician: Jackelin Hogan MD    Admission Diagnoses: STEMI (ST elevation myocardial infarction) [facility]) [I21.3]    Discharge Diagnoses: Active Problems:    STEMI (ST elevation myocardial infarction) ([address] (7/23/2021)        Discharge Condition: stable    Cardiology Procedures this Admission:  [facility] with PCI to [facility] Course: Patient underwent above procedure without complication and remained stable without acute events. Discharge Exam:  Visit Vitals  /61   Pulse 79   Temp 98.4 °F (36.9 °C)   Resp 24   Ht 5' 11\" (1.803 m)   Wt 195 lb 15.8 oz (88.9 kg)   SpO2 98%   BMI 27.33 kg/m²       Abdomen: soft, non-tender  Extremities: extremities normal  Heart: regular rate and rhythm  Lungs: clear to auscultation bilaterally  Neck: no JVD  Neurologic: Grossly normal  Pulses: 2+ and symmetric    Disposition: Home    Patient Instructions:   Current Discharge Medication List      START taking these medications    Details   aspirin 81 mg chewable tablet Take 1 Tablet by mouth daily. Qty: 1 Tablet, Refills: 1      atorvastatin (LIPITOR) 80 mg tablet Take 1 Tablet by mouth nightly. Qty: 30 Tablet, Refills: 1      lisinopriL (PRINIVIL, ZESTRIL) 5 mg tablet Take 1 Tablet by mouth daily. Qty: 30 Tablet, Refills: 1      metoprolol succinate (TOPROL-XL) 25 mg XL tablet Take 1 Tablet by mouth daily. Qty: 30 Tablet, Refills: 1      ticagrelor (BRILINTA) 90 mg tablet Take 1 Tablet by mouth every twelve (12) hours every twelve (12) hours. Qty: 60 Tablet, Refills: 11             Referenced discharge instructions provided by nursing for diet and activity.     Follow-up with Dr. Radha Kohler           Signed:  Senait Gray MD  Cardiac Electrophysiology  7/24/2021  9:11 AM

## 2021-07-24 NOTE — DISCHARGE SUMMARY
SUBJECTIVE      No acute issues overnight. ACTIVE PROBLEM LIST     Patient Active Problem List    Diagnosis Date Noted    STEMI (ST elevation myocardial infarction) ([facility] [address] [facility] 07/23/2021          MEDICATIONS     Current Facility-Administered Medications   Medication Dose Route Frequency    sodium chloride (NS) flush 5-40 mL  5-40 mL IntraVENous Q8H    sodium chloride (NS) flush 5-40 mL  5-40 mL IntraVENous PRN    sodium chloride (NS) flush 5-40 mL  5-40 mL IntraVENous Q8H    sodium chloride (NS) flush 5-40 mL  5-40 mL IntraVENous PRN    aspirin chewable tablet 81 mg  81 mg Oral DAILY    ticagrelor (BRILINTA) tablet 90 mg  90 mg Oral Q12H    atorvastatin (LIPITOR) tablet 80 mg  80 mg Oral QHS    enoxaparin (LOVENOX) injection 40 mg  40 mg SubCUTAneous Q24H    metoprolol succinate (TOPROL-XL) XL tablet 25 mg  25 mg Oral DAILY    lisinopriL (PRINIVIL, ZESTRIL) tablet 5 mg  5 mg Oral DAILY          PAST MEDICAL HISTORY     No past medical history on file. PAST SURGICAL HISTORY     No past surgical history on file. ALLERGIES     Allergies   Allergen Reactions    Keflex [Cephalexin] Rash          FAMILY HISTORY     No family history on file. negative for cardiac disease     SOCIAL HISTORY     Social History     Socioeconomic History    Marital status:      Spouse name: Not on file    Number of children: Not on file    Years of education: Not on file    Highest education level: Not on file     Social Determinants of Health     Financial Resource Strain:     Difficulty of Paying Living Expenses:    Food Insecurity:     Worried About Running Out of Food in the Last Year:     [address] in the Last Year:    Transportation Needs:     Lack of Transportation (Medical):      Lack of Transportation (Non-Medical):    Physical Activity:     Days of Exercise per Week:     Minutes of Exercise per Session:    Stress:     Feeling of Stress :    Social Connections:     Frequency of Communication with Friends and Family:     Frequency of Social Gatherings with Friends and Family:     Attends Orthodoxy Services:     Active Member of Clubs or Organizations:     Attends Club or Organization Meetings:     Marital Status:        MEDICATIONS     Current Facility-Administered Medications   Medication Dose Route Frequency    sodium chloride (NS) flush 5-40 mL  5-40 mL IntraVENous Q8H    sodium chloride (NS) flush 5-40 mL  5-40 mL IntraVENous PRN    sodium chloride (NS) flush 5-40 mL  5-40 mL IntraVENous Q8H    sodium chloride (NS) flush 5-40 mL  5-40 mL IntraVENous PRN    aspirin chewable tablet 81 mg  81 mg Oral DAILY    ticagrelor (BRILINTA) tablet 90 mg  90 mg Oral Q12H    atorvastatin (LIPITOR) tablet 80 mg  80 mg Oral QHS    enoxaparin (LOVENOX) injection 40 mg  40 mg SubCUTAneous Q24H    metoprolol succinate (TOPROL-XL) XL tablet 25 mg  25 mg Oral DAILY    lisinopriL (PRINIVIL, ZESTRIL) tablet 5 mg  5 mg Oral DAILY       I have reviewed the nurses notes, vitals, problem list, allergy list, medical history, family, social history and medications. REVIEW OF SYMPTOMS      General: Pt denies excessive weight gain or loss. Pt is able to conduct ADL's  HEENT: Denies blurred vision, headaches, hearing loss, epistaxis and difficulty swallowing. Respiratory: Denies cough, congestion, shortness of breath, MIXON, wheezing or stridor. Cardiovascular: Denies precordial pain, palpitations, edema or PND  Gastrointestinal: Denies poor appetite, indigestion, abdominal pain or blood in stool  Genitourinary: Denies hematuria, dysuria, increased urinary frequency  Musculoskeletal: Denies joint pain or swelling from muscles or joints  Neurologic: Denies tremor, paresthesias, headache, or sensory motor disturbance  Psychiatric: Denies confusion, insomnia, depression  Integumentray: Denies rash, itching or ulcers.   Hematologic: Denies easy bruising, bleeding       PHYSICAL EXAMINATION Vitals:    07/24/21 0700 07/24/21 0733 07/24/21 0800 07/24/21 0900   BP: 127/66  117/84 122/61   Pulse: 66 72 82 79   Resp: 20  20 24   Temp:   98.4 °F (36.9 °C)    SpO2: 93%  96% 98%   Weight:       Height:         General: Well developed, in no acute distress. HEENT: No jaundice, oral mucosa moist, no oral ulcers  Neck: Supple, no stiffness, no lymphadenopathy, supple  Heart:  Normal S1/S2 negative S3 or S4. Regular, no murmur, gallop or rub, no jugular venous distention  Respiratory: Clear bilaterally x 4, no wheezing or rales  Abdomen:   Soft, non-tender, bowel sounds are active.   Extremities:  No edema, normal cap refill, no cyanosis. Musculoskeletal: No clubbing, no deformities  Neuro: A&Ox3, speech clear, gait stable, cooperative, no focal neurologic deficits  Skin: Skin color is normal. No rashes or lesions. Non diaphoretic, moist.  Vascular: 2+ pulses symmetric in all extremities       DIAGNOSTIC DATA      TELEMETRY: Sinus rhythm         LABORATORY DATA      Lab Results   Component Value Date/Time    WBC 7.5 07/23/2021 01:16 PM    HGB 15.1 07/23/2021 01:16 PM    HCT 45.6 07/23/2021 01:16 PM    PLATELET 166 37/85/2249 01:16 PM    MCV 90.7 07/23/2021 01:16 PM      Lab Results   Component Value Date/Time    Sodium 140 07/24/2021 03:58 AM    Potassium 3.8 07/24/2021 03:58 AM    Chloride 109 (H) 07/24/2021 03:58 AM    CO2 25 07/24/2021 03:58 AM    Anion gap 6 07/24/2021 03:58 AM    Glucose 124 (H) 07/24/2021 03:58 AM    BUN 14 07/24/2021 03:58 AM    Creatinine 0.88 07/24/2021 03:58 AM    BUN/Creatinine ratio 16 07/24/2021 03:58 AM    GFR est AA >60 07/24/2021 03:58 AM    GFR est non-AA >60 07/24/2021 03:58 AM    Calcium 8.1 (L) 07/24/2021 03:58 AM    Bilirubin, total 0.5 07/23/2021 01:16 PM    Alk.  phosphatase 52 07/23/2021 01:16 PM    Protein, total 6.9 07/23/2021 01:16 PM    Albumin 3.9 07/23/2021 01:16 PM    Globulin 3.0 07/23/2021 01:16 PM    A-G Ratio 1.3 07/23/2021 01:16 PM    ALT (SGPT) 41 07/23/2021 01:16 PM           ASSESSMENT      1. STEMI  2.  CAD s/p PCI         PLAN     DC home        Kadie Javier MD  Cardiac Electrophysiology / Cardiology    [address]  [address] [facility], [address] [facility], [address] [place] [address], [facility] REBEKA. Nannette Arita.    [facility], [facility]  [phone] / [phone] Fax   [phone] / [phone] Fax

## 2021-07-24 NOTE — ROUTINE PROCESS
1900 Bedside and Verbal shift change report given to Tigre Fitch (oncoming nurse) by Rosa (offgoing nurse). Report included the following information SBAR, Kardex, ED Summary, Procedure Summary, Intake/Output, MAR, Accordion, Recent Results and Cardiac Rhythm NSR.               0700 Bedside and Verbal shift change report given to Edwin (oncoming nurse) by Tigre Fitch (offgoing nurse). Report included the following information SBAR, Kardex, Procedure Summary, Intake/Output, MAR, Accordion, Recent Results and Cardiac Rhythm NSR.

## 2021-07-24 NOTE — PROGRESS NOTES
Problem: Falls - Risk of  Goal: *Absence of Falls  Description: Document Jenna Waleska Fall Risk and appropriate interventions in the flowsheet.   Outcome: Progressing Towards Goal  Note: Fall Risk Interventions:            Medication Interventions: Assess postural VS orthostatic hypotension, Bed/chair exit alarm, Evaluate medications/consider consulting pharmacy, Patient to call before getting OOB                   Problem: Patient Education: Go to Patient Education Activity  Goal: Patient/Family Education  Outcome: Progressing Towards Goal

## 2021-07-24 NOTE — PROGRESS NOTES
Problem: Falls - Risk of  Goal: *Absence of Falls  Description: Document Aye Spare Fall Risk and appropriate interventions in the flowsheet.   Outcome: Resolved/Not Met

## 2021-07-25 NOTE — PROCEDURES
BRIEF PROCEDURE NOTE    Date of Procedure: 7/25/2021   Preoperative Diagnosis: Acute coronary syndrome  Postoperative Diagnosis: Acute coronary syndrome  Procedure: Left heart cath, coronary angiography  Interventional Cardiologist: Kayy Schulte DO  Assistant: None  Anesthesia: local + IV moderate sedation   I administered moderate sedation throughout this procedure. An independent trained observer pushed medications at my direction, and monitored the patients level of consciousness and physiological status throughout. Estimated Blood Loss: Minimal    Access: Right radial artery, 6F  Catheters:  Left coronary: JL 3.5, 5F  Right coronary: JR4, 5F    Findings:   L Main: Large caliber vessel, short, mild disease  LAD: Large caliber vessel, proximal mild disease, mid vessel hazy 90% flow-limiting stenosis with subsequent distal 40% stenosis, small D1, moderate D2 with diffuse mild to moderate disease  LCx: Large caliber vessel, AV groove circumflex 70 to 80% stenosis, several high small caliber obtuse marginals, OM2 with a proximal tubular 50 to 60% stenosis, OM 3 diffuse mild disease  RCA: Large-caliber vessel, ostial 60% stenosis, diffuse mild disease throughout right coronary artery, moderate distal right coronary artery disease, small PL branch and small PDA with diffuse mild disease    LVEDP:  5 mmhg    PCI:  EBU 3 5, 6F  Systemic heparinization    LAD  Jorge blue passed into distal LAD. Mid LAD dilated with 2.5 compliant balloon. IVUS used for sizing. Mid LAD just distal to D2 was stented with a 3.0 x 23 mm Xience Nandini QUINN, optimized with 3.5 noncompliant balloon at 16 to 20 fadi. IVUS showed well opposed stent. No evidence of dissection or perforation. JAQUI-3 flow into distal vessel post PCI    Circumflex  Jorge blue remained in the LAD. Run-through was passed into distal circumflex. Circumflex was dilated with 3.5 noncompliant balloon. IVUS was used for vessel sizing.   AV groove circumflex was stented with a 3.5X 23 mm Xience [address] QUINN. Stent was optimized with a 3.75 NC balloon at 16 to 20 fadi. Required stuart wire in order to pass noncompliant balloon into circumflex. No evidence of dissection or perforation. JAQUI-3 flow into distal circumflex post PCI. Specimens Removed: None    Implants: Xience Nandini QUINN x2    Closure Device: radial TR band    See full cath note. Complications: none      Findings:  1. Acute coronary syndrome with critical stenosis of mid LAD with evidence of severe stenosis of AV groove circumflex with moderate disease of the ostial right coronary artery  2. Mid LAD stented with 3.0 x 23 mm Xience Nandini QUINN optimized with 3.5 noncompliant balloon  3. AV groove circumflex stented with 3.5X 23 mm Xience Nandini QUINN, optimized with 3.75 noncompliant balloon  4.   Normal LVEDP      Plan:  Goal-directed medical therapy  Brilinta based DAPT  High-dose statin therapy  Cardiac rehab    Shy Lovett, DO Nick Lew,   [facility] [address] [place] [address]                                              Office [phone],U [phone]

## 2021-07-26 ENCOUNTER — PATIENT OUTREACH (OUTPATIENT)
Dept: CASE MANAGEMENT | Age: 76
End: 2021-07-26

## 2021-07-26 NOTE — PROGRESS NOTES
21 at 1:43pm:  Care Transitions Outreach Attempt    Call within 2 business days of discharge: Yes   Attempted to reach patient for transitions of care follow up. Unable to reach patient. Patient does not have a PHI form scanned into his chart at this time. Patient: Joey Bahena Patient : 1945 MRN: [id number]    Last Discharge [address]       Complaint Diagnosis Description Type Department Provider    21 Chest Pain ST elevation myocardial infarction (STEMI) of anterior wall ([address] . .. ED to Hosp-Admission (Discharged) (ADMIT) [id number] Kylah Bhatti DO; Izabela Desai. .. Was this an external facility discharge? No      Discharge Facility: n/a    Noted following upcoming appointments from discharge chart review:   [address] follow up appointment(s):   Future Appointments   Date Time Provider Shaun Headley   2021  4:00 PM Kylah Bhatti DO [facility] BS [facility]     Non-[facility] follow up appointment(s): None noted at this time. 21 at 3:14pm:  Care Transitions Nurse (CTN) made second attempt to reach patient by phone for transitions of care follow-up call. Unable to reach patient and patient does not have a PHI form scanned into his chart at this time. CTN will attempt to reach patient again and CTN will continue to monitor.

## 2021-07-29 ENCOUNTER — OFFICE VISIT (OUTPATIENT)
Dept: CARDIOLOGY CLINIC | Age: 76
End: 2021-07-29
Payer: MEDICARE

## 2021-07-29 VITALS
HEIGHT: 71 IN | WEIGHT: 199 LBS | SYSTOLIC BLOOD PRESSURE: 136 MMHG | BODY MASS INDEX: 27.86 KG/M2 | OXYGEN SATURATION: 97 % | HEART RATE: 82 BPM | DIASTOLIC BLOOD PRESSURE: 78 MMHG

## 2021-07-29 DIAGNOSIS — I21.4 NSTEMI (NON-ST ELEVATED MYOCARDIAL INFARCTION) (HCC): ICD-10-CM

## 2021-07-29 DIAGNOSIS — I10 ESSENTIAL HYPERTENSION: ICD-10-CM

## 2021-07-29 DIAGNOSIS — I50.20 HFREF (HEART FAILURE WITH REDUCED EJECTION FRACTION) (HCC): ICD-10-CM

## 2021-07-29 PROCEDURE — 99496 TRANSJ CARE MGMT HIGH F2F 7D: CPT | Performed by: STUDENT IN AN ORGANIZED HEALTH CARE EDUCATION/TRAINING PROGRAM

## 2021-07-29 RX ORDER — CLOPIDOGREL BISULFATE 75 MG/1
75 TABLET ORAL DAILY
Qty: 90 TABLET | Refills: 3 | Status: SHIPPED | OUTPATIENT
Start: 2021-07-29 | End: 2021-08-23 | Stop reason: ALTCHOICE

## 2021-07-29 RX ORDER — METOPROLOL SUCCINATE 25 MG/1
25 TABLET, EXTENDED RELEASE ORAL DAILY
Qty: 90 TABLET | Refills: 3 | Status: SHIPPED | OUTPATIENT
Start: 2021-07-29 | End: 2022-08-09

## 2021-07-29 RX ORDER — ATORVASTATIN CALCIUM 80 MG/1
80 TABLET, FILM COATED ORAL
Qty: 90 TABLET | Refills: 3 | Status: SHIPPED | OUTPATIENT
Start: 2021-07-29 | End: 2022-08-09

## 2021-07-29 RX ORDER — LISINOPRIL 5 MG/1
10 TABLET ORAL DAILY
Qty: 90 TABLET | Refills: 3 | Status: SHIPPED | OUTPATIENT
Start: 2021-07-29 | End: 2021-08-03 | Stop reason: SDUPTHER

## 2021-07-29 NOTE — PROGRESS NOTES
Benny Schaumann is a 77 y.o. male    Chief Complaint   Patient presents with   [facility] Follow Up     STEMI     Concerns about cost Brilinta     Chest pain No    SOB No     Dizziness No    Swelling No    Refills No    Visit Vitals  /78 (BP 1 Location: Left upper arm, BP Patient Position: Sitting)   Pulse 82   Ht 5' 11\" (1.803 m)   Wt 199 lb (90.3 kg)   SpO2 97%   BMI 27.75 kg/m²       1. Have you been to the ER, urgent care clinic since your last visit? Hospitalized since your last visit? ED 7/23-7/24    2. Have you seen or consulted any other health care providers outside of the [address] [place] [address] since your last visit? Include any pap smears or colon screening.   No

## 2021-07-29 NOTE — PROGRESS NOTES
[facility] [address], [address] [facility], [address] [place] [address]    Office [phone],T [phone]           Jessika Lopez is a 77 y.o. male into the office for transition of care visit, STEMI      Assessment/Recommendations:      ICD-10-CM ICD-9-CM    1. ST elevation myocardial infarction involving left main coronary artery ([facility])  I21.01 410.11 LIPID PANEL      REFERRAL TO CARDIAC REHAB   2. Essential hypertension  I10 401.9    3. HFrEF (heart failure with reduced ejection fraction) ([facility])  I50.20 428.20        Coronary artery disease, NSTEMI 7/22/21. · Acute coronary syndrome with critical stenosis of mid LAD with evidence of severe stenosis of AV groove circumflex with moderate disease of the ostial right coronary artery  · Mid LAD stented with 3.0 x 23 mm Xience Nandini QUINN optimized with 3.5 noncompliant balloon  · AV groove circumflex stented with 3.5X 23 mm Xience Nandini QUINN, optimized with 3.75 noncompliant balloon  · Normal LVEDP    -Continue DAPT. Ticagrelor cost prohibitive with his insurance. Recommend to continue his current month of ticagrelor and subsequently changed to Plavix based DAPT. Recommend 300 mg loading dose with subsequent 75 mg daily  -Titration of lisinopril to 10 mg daily  -Continue metoprolol succinate 25 mg daily  -Continue atorvastatin 80 mg daily. Goal LDL less than 55. Obtain repeat lipid panel prior to follow-up visit in approximately 1 month  -Recommend obtaining exercise Cardiolite in approximately 6 to 8 weeks to reassess ischemia within the inferior wall  -cardiac rehab referral    HFrEF, moderate LV dysfunction-suspect myocardial stunning due to acute myocardial infarction. No ongoing symptoms of CHF. LVEF 35 to 40%. Continue goal-directed medical therapy with ACE inhibitor and beta-blocker therapy.   We will repeat an echocardiogram in approximately 6 to 8 weeks to reassess LV function         Primary Care Physician- Ghazal Shields DO    Follow-up proximately 1 month      []    High complexity decision making was performed  []    Patient is at high-risk of decompensation with multiple organ involvement      Subjective:  77 y.o. presents the office for follow-up visit. He presented to the ED last Friday with acute onset of chest pain. Found to have critical stenosis within mid LAD along with severe stenosis in proximal circumflex. Underwent PCI of his mid LAD along with proximal circumflex. Also has moderate ostial right coronary artery disease. Echocardiogram showed moderate reduced LVEF 35 to 40% with wall motion abnormalities within the LAD territory. Since hospital discharge she continues to do very well. No ongoing chest pain or chest pressure symptoms. No shortness of breath. No adverse bleeding with DAPT. Reports Brilinta cost approximately $800 for 3-month supply. No adverse side effects with statin therapy. Current Outpatient Medications:     atorvastatin (LIPITOR) 80 mg tablet, Take 1 Tablet by mouth nightly., Disp: 90 Tablet, Rfl: 3    lisinopriL (PRINIVIL, ZESTRIL) 5 mg tablet, Take 2 Tablets by mouth daily. , Disp: 90 Tablet, Rfl: 3    metoprolol succinate (TOPROL-XL) 25 mg XL tablet, Take 1 Tablet by mouth daily. , Disp: 90 Tablet, Rfl: 3    clopidogreL (Plavix) 75 mg tab, Take 1 Tablet by mouth daily. First doage 300mg, then 75mg daily, Disp: 90 Tablet, Rfl: 3    aspirin 81 mg chewable tablet, Take 1 Tablet by mouth daily. , Disp: 1 Tablet, Rfl: 1    Allergies   Allergen Reactions    Keflex [Cephalexin] Rash        Family History   Problem Relation Age of Onset    Cancer Mother     Hypertension Father     Cancer Father        Social History     Tobacco Use    Smoking status: Never Smoker    Smokeless tobacco: Never Used   Substance Use Topics    Alcohol use: Yes     Comment: rarely     Drug use: Not on file       Review of Symptoms:  Pertinent Positive: neg  Pertinent Negative:No chest pain, dyspnea on exertion, shortness of breath, orthopnea, PND    All Other systems reviewed and are negative for a Comprehensive ROS (10+)    Physical Exam    Blood pressure 136/78, pulse 82, height 5' 11\" (1.803 m), weight 199 lb (90.3 kg), SpO2 97 %. Constitutional:  well-developed and well-nourished. No distress. HENT: Normocephalic. Eyes: No scleral icterus. Neck:  Neck supple. No JVD present. Pulmonary/Chest: Effort normal and breath sounds normal. No respiratory distress, wheezes or rales. Cardiovascular: Normal rate, regular rhythm, S1 S2 . Exam reveals no gallop and no friction rub. No murmur heard. No edema. Extremities:  Normal muscle tone  Abdominal:   No abnormal distension. Neurological:  Moving all extremities, cranial nerves appear grossly intact. Skin: Skin is not cold. Not diaphoretic. No erythema. Psychiatric:  Grossly normal mood and affect. Intact insight. Objective Data: Investigations personally reviewed and interpreted            Investigations reviewed     07/23/21    CARDIAC PROCEDURE 07/25/2021 7/25/2021  Findings:  L Main: Large caliber vessel, short, mild disease  LAD: Large caliber vessel, proximal mild disease, mid vessel hazy 90% flow-limiting stenosis with subsequent distal 40% stenosis, small D1, moderate D2 with diffuse mild to moderate disease  LCx: Large caliber vessel, AV groove circumflex 70 to 80% stenosis, several high small caliber obtuse marginals, OM2 with a proximal tubular 50 to 60% stenosis, OM 3 diffuse mild disease  RCA: Large-caliber vessel, ostial 60% stenosis, diffuse mild disease throughout right coronary artery, moderate distal right coronary artery disease, small PL branch and small PDA with diffuse mild disease    LVEDP:  5 mmhg    PCI:  EBU 3 5, 6F  Systemic heparinization    LAD  Jorge blue passed into distal LAD. Mid LAD dilated with 2.5 compliant balloon. IVUS used for sizing.   Mid LAD just distal to D2 was stented with a 3.0 x 23 mm Xience Nandini QUINN, optimized with 3.5 noncompliant balloon at 16 to 20 fadi. IVUS showed well opposed stent. No evidence of dissection or perforation. JAQUI-3 flow into distal vessel post PCI    Circumflex  Jorge blue remained in the LAD. Run-through was passed into distal circumflex. Circumflex was dilated with 3.5 noncompliant balloon. IVUS was used for vessel sizing. AV groove circumflex was stented with a 3.5X 23 mm Xience Nandini QUINN. Stent was optimized with a 3.75 NC balloon at 16 to 20 fadi. Required buddy wire in order to pass noncompliant balloon into circumflex. No evidence of dissection or perforation. JAQUI-3 flow into distal circumflex post PCI. ECHO ADULT COMPLETE 07/23/2021    Interpretation Summary  · LV: Estimated LVEF is 35 - 40%. Normal cavity size. Upper normal wall thickness. Moderately and segmentally reduced systolic function. Left ventricular diastolic dysfunction. · AV: Aortic valve leaflet calcification present without reduced excursion. Mild aortic valve regurgitation is present. · MV: Mitral valve leaflet calcification. · RA: Mildly dilated right atrium. Joyce Tipton, DO          ATTENTION:   This medical record was transcribed using an electronic medical records/speech recognition system. Although proofread, it may and can contain electronic, spelling and other errors. Corrections may be executed at a later time. Please feel free to contact us for any clarifications as needed.

## 2021-07-30 NOTE — TELEPHONE ENCOUNTER
Medication samples per VO Dr Vesna Middleton    Requested Prescriptions     Pending Prescriptions Disp Refills    ticagrelor (BRILINTA) 90 mg tablet 16 Tablet 0     Sig: Take 1 Tablet by mouth every twelve (12) hours.

## 2021-08-03 RX ORDER — LISINOPRIL 10 MG/1
10 TABLET ORAL DAILY
Qty: 90 TABLET | Refills: 3 | Status: SHIPPED | OUTPATIENT
Start: 2021-08-03 | End: 2022-07-21

## 2021-08-03 NOTE — TELEPHONE ENCOUNTER
LOV 7/29/2021  NOV 8/23/2021    Medication refill per VO per Dr. Hayden Asp    Requested Prescriptions     Pending Prescriptions Disp Refills    lisinopriL (PRINIVIL, ZESTRIL) 10 mg tablet 90 Tablet 3     Sig: Take 1 Tablet by mouth daily.

## 2021-08-10 ENCOUNTER — PATIENT OUTREACH (OUTPATIENT)
Dept: CASE MANAGEMENT | Age: 76
End: 2021-08-10

## 2021-08-10 NOTE — PROGRESS NOTES
8-10-21 at 3:11pm:  Care Transitions Outreach - Third Attempt    Call within 2 business days of discharge: Yes   Attempted to reach patient for transitions of care follow up. Unable to reach patient and 'Unable to Reach by Phone' letter sent to patient. Patient: Cosme Bryan Patient : 1945 MRN: [id number]    Last Discharge [address]       Complaint Diagnosis Description Type Department Provider    21 Chest Pain ST elevation myocardial infarction (STEMI) of anterior wall ([address] . .. ED to Hosp-Admission (Discharged) (ADMIT) [id number] Guillermo Montalvo DO; Bill Quintero. .. Was this an external facility discharge? No   Discharge Facility: n/a    Noted following upcoming appointments from discharge chart review:   [facility] follow up appointment(s):   Future Appointments   Date Time Provider Shaun Headley   2021 10:00 AM BENI [id number] [place]   2021 10:00 AM Josep HERNÁNDEZ DO [facility]     Non-BS follow up appointment(s): None noted at this time.

## 2021-08-10 NOTE — LETTER
8/10/2021 3:20 PM    Mr. Anirudh Keyes  [address] [address]      Dear Mr. Anirudh Keyes:    My name is Jacob Welsh and I am a Care Transition Nurse who partners with your provider to improve patients' health. I've been trying to reach you via phone to let you know that, as a member of your care team, I will work with other providers involved in your care, offer education for your specific health conditions, and connect you with more resources as needed. This program is designed to provide you with the opportunity to have a ([address] [facility]/[address]) care manager partner with you for the following situations:     1) if you come home from the hospital or emergency room   2) to help manage your disease   3) when you would like assistance coordinating services or appointments    This added support is provided at no additional cost to you. My primary focus is to help you achieve specific goals and improve your health. Please call me at [phone] to further discuss how I can support your health care needs.     Sincerely,  Jacob Welsh RN

## 2021-08-12 ENCOUNTER — HOSPITAL ENCOUNTER (OUTPATIENT)
Dept: CARDIAC REHAB | Age: 76
Discharge: HOME | End: 2021-08-12
Payer: MEDICARE

## 2021-08-12 VITALS — BODY MASS INDEX: 27.8 KG/M2 | HEIGHT: 71 IN | WEIGHT: 198.6 LBS

## 2021-08-12 PROCEDURE — 93798 PHYS/QHP OP CAR RHAB W/ECG: CPT

## 2021-08-12 NOTE — CARDIO/PULMONARY
[facility] Cardiopulmonary Rehab Orientation:  Met with Levi Becker. [id number], for cardiac rehab orientation and exercise tolerance test today. He is a 77year-old patient of Dr Aniya Puga, S/P STEMI with PCI-QUINN to LAD and LCx with primary diagnosis of STEMI. LVEF 35%. Cardiac risk factors include: age, gender, and family hx. BMI 27.7. Pt has never smoked cigarettes. CAD risk factors were reviewed with patient. Pt resides with wife Ronak Brewer in [place]. He has a Masters Degree in [employer] and is retired. Collectively, he and his wife have 5 children. Depression score PHQ9 is 1. The result was discussed with patient, who affirms score to be accurate. Patient denied chest pain or SOB during 6 minute exercise tolerance test on treadmill at 2.2 mph, with peak , peak /78, and RPE 11. Cardiac rhythm was SR/ST with occ PACs. Pt walks almost daily at home with wife. He enjoys working outside with yard work at his home. Pt was given the Cardiac Rehab manual and an exercise plan was developed. Pt will attend cardiac rehab 2-3 times per week. Pt verbalized plan to continue home exercise of walking.

## 2021-08-13 ENCOUNTER — HOSPITAL ENCOUNTER (OUTPATIENT)
Dept: CARDIAC REHAB | Age: 76
Discharge: HOME | End: 2021-08-13
Payer: MEDICARE

## 2021-08-13 VITALS — BODY MASS INDEX: 27.64 KG/M2 | WEIGHT: 198.2 LBS

## 2021-08-13 PROCEDURE — 93798 PHYS/QHP OP CAR RHAB W/ECG: CPT

## 2021-08-16 ENCOUNTER — HOSPITAL ENCOUNTER (OUTPATIENT)
Dept: CARDIAC REHAB | Age: 76
Discharge: HOME | End: 2021-08-16
Payer: MEDICARE

## 2021-08-16 VITALS — WEIGHT: 195.2 LBS | BODY MASS INDEX: 27.22 KG/M2

## 2021-08-16 PROCEDURE — 93798 PHYS/QHP OP CAR RHAB W/ECG: CPT

## 2021-08-18 ENCOUNTER — HOSPITAL ENCOUNTER (OUTPATIENT)
Dept: CARDIAC REHAB | Age: 76
Discharge: HOME | End: 2021-08-18
Payer: MEDICARE

## 2021-08-18 VITALS — BODY MASS INDEX: 27.53 KG/M2 | WEIGHT: 197.4 LBS

## 2021-08-18 PROCEDURE — 93798 PHYS/QHP OP CAR RHAB W/ECG: CPT

## 2021-08-20 ENCOUNTER — HOSPITAL ENCOUNTER (OUTPATIENT)
Dept: CARDIAC REHAB | Age: 76
Discharge: HOME | End: 2021-08-20
Payer: MEDICARE

## 2021-08-20 VITALS — BODY MASS INDEX: 27.64 KG/M2 | WEIGHT: 198.2 LBS

## 2021-08-20 PROCEDURE — 93798 PHYS/QHP OP CAR RHAB W/ECG: CPT

## 2021-08-23 ENCOUNTER — TELEPHONE (OUTPATIENT)
Dept: CARDIOLOGY CLINIC | Age: 76
End: 2021-08-23

## 2021-08-23 ENCOUNTER — OFFICE VISIT (OUTPATIENT)
Dept: CARDIOLOGY CLINIC | Age: 76
End: 2021-08-23
Payer: MEDICARE

## 2021-08-23 VITALS
DIASTOLIC BLOOD PRESSURE: 70 MMHG | OXYGEN SATURATION: 99 % | HEIGHT: 71 IN | BODY MASS INDEX: 27.41 KG/M2 | SYSTOLIC BLOOD PRESSURE: 138 MMHG | HEART RATE: 80 BPM | WEIGHT: 195.8 LBS

## 2021-08-23 DIAGNOSIS — I10 ESSENTIAL HYPERTENSION: ICD-10-CM

## 2021-08-23 DIAGNOSIS — I25.118 CORONARY ARTERY DISEASE OF NATIVE ARTERY OF NATIVE HEART WITH STABLE ANGINA PECTORIS (HCC): ICD-10-CM

## 2021-08-23 DIAGNOSIS — I50.20 HFREF (HEART FAILURE WITH REDUCED EJECTION FRACTION) (HCC): Primary | ICD-10-CM

## 2021-08-23 DIAGNOSIS — I21.4 NSTEMI (NON-ST ELEVATED MYOCARDIAL INFARCTION) (HCC): ICD-10-CM

## 2021-08-23 PROCEDURE — 99214 OFFICE O/P EST MOD 30 MIN: CPT | Performed by: STUDENT IN AN ORGANIZED HEALTH CARE EDUCATION/TRAINING PROGRAM

## 2021-08-23 PROCEDURE — G0463 HOSPITAL OUTPT CLINIC VISIT: HCPCS | Performed by: STUDENT IN AN ORGANIZED HEALTH CARE EDUCATION/TRAINING PROGRAM

## 2021-08-23 NOTE — PROGRESS NOTES
[facility] [address], [address] [facility], [address] [place] [address]    Office [phone],[facility] [phone]           Vinita Blakely is a 77 y.o. male  Presents to office for f/up evaluation      Assessment/Recommendations:      ICD-10-CM ICD-9-CM    1. HFrEF (heart failure with reduced ejection fraction) ([facility])  I50.20 428.20 ECHO ADULT COMPLETE   2. Coronary artery disease of native artery of native heart with stable angina pectoris ([facility]  I25.118 414.01 NUCLEAR CARDIAC STRESS TEST     413.9    3. NSTEMI (non-ST elevated myocardial infarction) [facility]  I21.4 410.70    4. Essential hypertension  I10 401.9        Coronary artery disease, NSTEMI 7/22/21. · Acute coronary syndrome with critical stenosis of mid LAD with evidence of severe stenosis of AV groove circumflex with moderate disease of the ostial right coronary artery  · Mid LAD stented with 3.0 x 23 mm Xience Nandini QUINN optimized with 3.5 noncompliant balloon  · AV groove circumflex stented with 3.5X 23 mm Xience Nandini QUINN, optimized with 3.75 noncompliant balloon    -Continue DAPT. -Continue lisinopril to 10 mg daily  -Continue metoprolol succinate 25 mg daily  -Continue atorvastatin 80 mg daily. LDL at time of myocardial infarction 124, decreased to 18 g/dL with atorvastatin 80 mg daily. Recommend to continue statin therapy. -Recommend obtaining exercise Cardiolite to reassess ischemia within the inferior wall  -Continue cardiac rehab    HFrEF, moderate LV dysfunction suspect myocardial stunning due to acute myocardial infarction. No ongoing symptoms of CHF. LVEF 35 to 40%. Continue goal-directed medical therapy with ACE inhibitor and beta-blocker therapy. Repeat echocardiogram to reassess LV function.        Primary Care Physician- Ruth Mena, DO    Follow-up 3 months      []    High complexity decision making was performed  []    Patient is at high-risk of decompensation with multiple organ involvement      Subjective:  77 y.o. presents the office for follow-up visit. CAD hx  He presented to the ED 7/21 with acute onset of chest pain. Found to have critical stenosis within mid LAD along with severe stenosis in proximal circumflex. Underwent PCI of his mid LAD along with proximal circumflex. Also has moderate ostial right coronary artery disease. Echocardiogram showed moderate reduced LVEF 35 to 40% with wall motion abnormalities within the LAD territory. While doing fairly well. No ongoing chest pain or chest pressure symptoms. No exertional dyspnea. Tolerating Brilinta based DAPT. Has not yet changed to Plavix. Scheduled to make a trip to see his children in [place] and [place] over the next several weeks. Current Outpatient Medications:     lisinopriL (PRINIVIL, ZESTRIL) 10 mg tablet, Take 1 Tablet by mouth daily. , Disp: 90 Tablet, Rfl: 3    ticagrelor (BRILINTA) 90 mg tablet, Take 1 Tablet by mouth every twelve (12) hours. , Disp: 16 Tablet, Rfl: 0    atorvastatin (LIPITOR) 80 mg tablet, Take 1 Tablet by mouth nightly., Disp: 90 Tablet, Rfl: 3    metoprolol succinate (TOPROL-XL) 25 mg XL tablet, Take 1 Tablet by mouth daily. , Disp: 90 Tablet, Rfl: 3    aspirin 81 mg chewable tablet, Take 1 Tablet by mouth daily. , Disp: 1 Tablet, Rfl: 1    clopidogreL (Plavix) 75 mg tab, Take 1 Tablet by mouth daily. First doage 300mg, then 75mg daily (Patient not taking: Reported on 8/12/2021), Disp: 90 Tablet, Rfl: 3    Allergies   Allergen Reactions    Keflex [Cephalexin] Rash        Family History   Problem Relation Age of Onset    Cancer Mother     Hypertension Father     Cancer Father        Social History     Tobacco Use    Smoking status: Never Smoker    Smokeless tobacco: Never Used   Vaping Use    Vaping Use: Never used   Substance Use Topics    Alcohol use:  Yes     Alcohol/week: 2.0 standard drinks     Types: 1 Glasses of wine, 1 Cans of beer per week     Comment: daily    Drug use: Never       Review of Symptoms:  Pertinent Positive: neg  Pertinent Negative:No chest pain, dyspnea on exertion, shortness of breath, orthopnea, PND    All Other systems reviewed and are negative for a Comprehensive ROS (10+)    Physical Exam    Blood pressure 138/70, pulse 80, height 5' 11\" (1.803 m), weight 195 lb 12.8 oz (88.8 kg), SpO2 99 %. Constitutional:  well-developed and well-nourished. No distress. HENT: Normocephalic. Eyes: No scleral icterus. Neck:  Neck supple. No JVD present. Pulmonary/Chest: Effort normal and breath sounds normal. No respiratory distress, wheezes or rales. Cardiovascular: Normal rate, regular rhythm, S1 S2 . Exam reveals no gallop and no friction rub. No murmur heard. No edema. Extremities:  Normal muscle tone  Abdominal:   No abnormal distension. Neurological:  Moving all extremities, cranial nerves appear grossly intact. Skin: Skin is not cold. Not diaphoretic. No erythema. Psychiatric:  Grossly normal mood and affect. Intact insight. Objective Data: Investigations personally reviewed and interpreted            Investigations reviewed     07/23/21    CARDIAC PROCEDURE 07/25/2021 7/25/2021  Findings:  L Main: Large caliber vessel, short, mild disease  LAD: Large caliber vessel, proximal mild disease, mid vessel hazy 90% flow-limiting stenosis with subsequent distal 40% stenosis, small D1, moderate D2 with diffuse mild to moderate disease  LCx: Large caliber vessel, AV groove circumflex 70 to 80% stenosis, several high small caliber obtuse marginals, OM2 with a proximal tubular 50 to 60% stenosis, OM 3 diffuse mild disease  RCA: Large-caliber vessel, ostial 60% stenosis, diffuse mild disease throughout right coronary artery, moderate distal right coronary artery disease, small PL branch and small PDA with diffuse mild disease    LVEDP:  5 mmhg    PCI:  EBU 3 5, 6F  Systemic heparinization    LAD  Jorge blue passed into distal LAD.   Mid LAD dilated with 2.5 compliant balloon. IVUS used for sizing. Mid LAD just distal to D2 was stented with a 3.0 x 23 mm Xience Nandini QUINN, optimized with 3.5 noncompliant balloon at 16 to 20 fadi. IVUS showed well opposed stent. No evidence of dissection or perforation. JAQUI-3 flow into distal vessel post PCI    Circumflex  Jorge blue remained in the LAD. Run-through was passed into distal circumflex. Circumflex was dilated with 3.5 noncompliant balloon. IVUS was used for vessel sizing. AV groove circumflex was stented with a 3.5X 23 mm Xience Nandini QUINN. Stent was optimized with a 3.75 NC balloon at 16 to 20 fadi. Required buddy wire in order to pass noncompliant balloon into circumflex. No evidence of dissection or perforation. JAQUI-3 flow into distal circumflex post PCI. ECHO ADULT COMPLETE 07/23/2021    Interpretation Summary  · LV: Estimated LVEF is 35 - 40%. Normal cavity size. Upper normal wall thickness. Moderately and segmentally reduced systolic function. Left ventricular diastolic dysfunction. · AV: Aortic valve leaflet calcification present without reduced excursion. Mild aortic valve regurgitation is present. · MV: Mitral valve leaflet calcification. · RA: Mildly dilated right atrium. Joyce Dryer, DO          ATTENTION:   This medical record was transcribed using an electronic medical records/speech recognition system. Although proofread, it may and can contain electronic, spelling and other errors. Corrections may be executed at a later time. Please feel free to contact us for any clarifications as needed.

## 2021-08-23 NOTE — PROGRESS NOTES
Jessika Lopez is a 77 y.o. male    Chief Complaint   Patient presents with    Follow-up     4 week f/u NSTEMI, HFrEF    Hypertension       Chest pain No    SOB No     Dizziness No    Swelling No    Refills brilinta samples if we have some     Visit Vitals  /70 (BP 1 Location: Left upper arm, BP Patient Position: Sitting)   Pulse 80   Ht 5' 11\" (1.803 m)   Wt 195 lb 12.8 oz (88.8 kg)   SpO2 99%   BMI 27.31 kg/m²       1. Have you been to the ER, urgent care clinic since your last visit? Hospitalized since your last visit? ED 7/23-7/24    2. Have you seen or consulted any other health care providers outside of the [address] [place] [address] since your last visit? Include any pap smears or colon screening.   No

## 2021-08-23 NOTE — TELEPHONE ENCOUNTER
[facility] is calling as the patient states that he was being called in a prescription for plavix but they have not received it and was calling to request that a prescription be sent over.  Please advise    Phone: [phone]

## 2021-08-24 RX ORDER — CLOPIDOGREL BISULFATE 75 MG/1
75 TABLET ORAL DAILY
Qty: 90 TABLET | Refills: 1 | Status: SHIPPED | OUTPATIENT
Start: 2021-08-24 | End: 2021-11-29

## 2021-08-24 NOTE — TELEPHONE ENCOUNTER
Patient will start Plavix once he has completed the samples of Brilinta. Requested Prescriptions     Pending Prescriptions Disp Refills    clopidogreL (Plavix) 75 mg tab 90 Tablet 1     Sig: Take 1 Tablet by mouth daily.

## 2021-08-24 NOTE — TELEPHONE ENCOUNTER
Patient calling to ask if his prescriptin for plavix can be sent to the pharmacy, patient states the pharmacy sent a fax, patient is going on vacation on 8/25/21 and would like this prescription, [facility] [phone]

## 2021-09-15 ENCOUNTER — HOSPITAL ENCOUNTER (OUTPATIENT)
Dept: CARDIAC REHAB | Age: 76
Discharge: HOME | End: 2021-09-15
Payer: MEDICARE

## 2021-09-15 VITALS — WEIGHT: 196.4 LBS | BODY MASS INDEX: 27.39 KG/M2

## 2021-09-15 PROCEDURE — 93797 PHYS/QHP OP CAR RHAB WO ECG: CPT | Performed by: DIETITIAN, REGISTERED

## 2021-09-15 PROCEDURE — 93798 PHYS/QHP OP CAR RHAB W/ECG: CPT

## 2021-09-15 NOTE — CARDIO/PULMONARY
Cardiac Rehab Nutrition Assessment - 1:1 Evaluation           NAME: Cedrick Montana : 1945 AGE: 77 y.o. GENDER: male  CARDIAC REHAB ADMITTING DIAGNOSIS: STEMI and stent (21)    PROBLEM LIST:  Patient Active Problem List   Diagnosis Code    STEMI (ST elevation myocardial infarction) ([facility].) I21.3     Heart Failure (EF 35%)      LABS:   Lab Results   Component Value Date/Time    Hemoglobin A1c 6.0 (H) 2021 01:16 PM     Lab Results   Component Value Date/Time    Cholesterol, total 75 2021 07:38 AM    HDL Cholesterol 43 2021 07:38 AM    LDL, calculated 18.6 2021 07:38 AM    VLDL, calculated 13.4 2021 07:38 AM    Triglyceride 67 2021 07:38 AM    CHOL/HDL Ratio 1.7 2021 07:38 AM         MEDICATIONS/SUPPLEMENTS:   [unfilled]  Prior to Admission medications    Medication Sig Start Date End Date Taking? Authorizing Provider   clopidogreL (Plavix) 75 mg tab Take 1 Tablet by mouth daily. 21   Dacia Carrizales, DO   ticagrelor (Brilinta) 90 mg tablet Take 1 Tablet by mouth two (2) times a day. 21   Leticia HERNÁNDEZ, DO   lisinopriL (PRINIVIL, ZESTRIL) 10 mg tablet Take 1 Tablet by mouth daily. 8/3/21   Dacia Carrizales, DO   atorvastatin (LIPITOR) 80 mg tablet Take 1 Tablet by mouth nightly. 21   Leticia HERNÁNDEZ, DO   metoprolol succinate (TOPROL-XL) 25 mg XL tablet Take 1 Tablet by mouth daily. 21   Leticia HERNÁNDEZ, DO   aspirin 81 mg chewable tablet Take 1 Tablet by mouth daily.  21   Blanquita Parekh NP           ANTHROPOMETRICS:    Ht Readings from Last 1 Encounters:   21 5' 11\" (1.803 m)      Wt Readings from Last 10 Encounters:   21 88.8 kg (195 lb 12.8 oz)   21 89.9 kg (198 lb 3.2 oz)   21 89.5 kg (197 lb 6.4 oz)   21 88.5 kg (195 lb 3.2 oz)   21 89.9 kg (198 lb 3.2 oz)   21 90.1 kg (198 lb 9.6 oz)   21 90.3 kg (199 lb)   21 88.9 kg (195 lb 15.8 oz)      IBW:172 # +/- 10%  %IBW: 114 % +/- 10%    BMI: 27.3 kg/M2 Category: overweight  Waist: 41 inches    Reported Wt Hx: reports a 7 lb weight loss starting around July, just before MI, he is concerned it could be muscle loss, states this is the lowest weight he has been in many years    Reported Diet Hx:    Rate Your Plate Score: 59  (Score 58-72: Making many healthy choices; 41-57: Some choices need improving 24-40: many choices need improving)    24 HOUR DIET RECALL  Breakfast Same daily: 1/2 cup toasted oats, 1/4 cup slivered almonds, handful blueberries, 12 oz 1% milk   Lunch Tortilla (1 flour) with 2 oz oven roasted turkey and slice of cheese, mustard; 1 orange; if hungry will have a few whole grain tortilla chips with hummus; decaf iced tea w/ monk fruit sweetener   Dinner Chicken tortilla soup (homemade with rotisserie chicken) with some tortilla strips; tonight will have chicken with carrots & asparagus   Snacks Nuts (mostly unsalted)   Beverages Coffee w/ 1% milk, 1/2 of a beer     Jessika Lopez states a reduced appetite since working out less due to MI. Since MI has changed lunch from roast beef to turkey sandwich, eating more fish, more veggies, leaner meats. He shares a glass of wine and / or beer with his wife nightly. They read food labels for sugar and try to limit / avoid canned foods. Environmental/Social: Mr. Sunitha Sullivan is retired; he stays active with 2 acre yard work (less vigorous since MI) and 30 minutes daily walks with his wife, attending at rehab to exercise up to 3 times per week; his wife does the grocery shopping and cooking; they enjoy travel         NUTRITION INTERVENTION:  Nutrition 60 minute one-on-one education & goal setting with Jessika Lopez    Reviewed with Jessika Lopez relevant labs compared to ideals.     Reviewed weight history and patient's verbalized weight goal as well as any real or perceived barriers to obtaining the goal. Collaborated with patient to set a specific short and long term weight goal.     Reviewed Rate Your Plate and conducted a verbal diet recall. Assessed for environmental, financial, psychosocial, physical and comorbidities that may impact the food and eating patterns / behaviors of [employer] with patient to set specific nutrient goals as well as specific food / behavior changes that will help patient meet the overall goal of following a heart healthy eating pattern (using guidelines as set forth by the American Heart Association and modeled after healthful eating patterns as recognized by the USDA Dietary Guidelines such as DASH, Mediterranean or plant-based). Briefly reviewed with Levora Bence the nutrition information in the Cardiac Rehab patient education book and encouraged Levora Bence to read thoroughly, ask questions as needed, and use for future reference for heart healthy nutrition information. Levora Bence is not scheduled to participate in Cardiac Rehab group nutrition classes. PATIENT GOALS:    Weight Goals:  Short Term Weight Goal: < 198 lbs (clinic scales)  Long Term Weight Goal: 190 lbs (home scales); waist under 40\"    Nutrition Goals:  Daily Recommendations:  Calories: 2100 /day  (using Bethene José Miguel with AF 1.3-.1.4; - 0 to 200 for modest to no weight loss)    Saturated Fat: no more than 14 g/day  Trans Fat: 0 g/day  Sodium: no more than 1903-7265 mg/day  Fruit: 3 cups / day  Vegetables: 3 or more cups/day    Other:  - read and compare food labels  - choose Swiss cheese for less sodium   - use oil & vinegar for lowest sodium salad dressing  - do not eat poultry skin  - plan ahead for healthy eating when travel                Questions addressed. Follow-up plans discussed. Levora Bence verbalized understanding.             Rosalinda Aldrich RD

## 2021-09-17 ENCOUNTER — HOSPITAL ENCOUNTER (OUTPATIENT)
Dept: CARDIAC REHAB | Age: 76
Discharge: HOME | End: 2021-09-17
Payer: MEDICARE

## 2021-09-17 VITALS — BODY MASS INDEX: 27.45 KG/M2 | WEIGHT: 196.8 LBS

## 2021-09-17 PROCEDURE — 93798 PHYS/QHP OP CAR RHAB W/ECG: CPT

## 2021-09-20 ENCOUNTER — ANCILLARY PROCEDURE (OUTPATIENT)
Dept: CARDIOLOGY CLINIC | Age: 76
End: 2021-09-20
Payer: MEDICARE

## 2021-09-20 VITALS — BODY MASS INDEX: 27.3 KG/M2 | WEIGHT: 195 LBS | HEIGHT: 71 IN

## 2021-09-20 VITALS
WEIGHT: 196 LBS | DIASTOLIC BLOOD PRESSURE: 62 MMHG | BODY MASS INDEX: 27.44 KG/M2 | HEIGHT: 71 IN | SYSTOLIC BLOOD PRESSURE: 118 MMHG

## 2021-09-20 DIAGNOSIS — I50.20 HFREF (HEART FAILURE WITH REDUCED EJECTION FRACTION) (HCC): ICD-10-CM

## 2021-09-20 DIAGNOSIS — I25.118 CORONARY ARTERY DISEASE OF NATIVE ARTERY OF NATIVE HEART WITH STABLE ANGINA PECTORIS (HCC): ICD-10-CM

## 2021-09-20 DIAGNOSIS — I21.3 ST ELEVATION MYOCARDIAL INFARCTION (STEMI), UNSPECIFIED ARTERY (HCC): ICD-10-CM

## 2021-09-20 LAB
STRESS BASELINE DIAS BP: 68 MMHG
STRESS BASELINE HR: 63 BPM
STRESS BASELINE SYS BP: 128 MMHG
STRESS ESTIMATED WORKLOAD: 1 METS
STRESS EXERCISE DUR MIN: NORMAL
STRESS O2 SAT PEAK: 100 %
STRESS O2 SAT REST: 96 %
STRESS PEAK DIAS BP: 54 MMHG
STRESS PEAK SYS BP: 108 MMHG
STRESS PERCENT HR ACHIEVED: 72 %
STRESS POST PEAK HR: 104 BPM
STRESS RATE PRESSURE PRODUCT: NORMAL BPM*MMHG
STRESS ST DEPRESSION: 0 MM
STRESS ST ELEVATION: 0 MM
STRESS TARGET HR: 145 BPM

## 2021-09-20 PROCEDURE — 93018 CV STRESS TEST I&R ONLY: CPT | Performed by: INTERNAL MEDICINE

## 2021-09-20 PROCEDURE — 78452 HT MUSCLE IMAGE SPECT MULT: CPT | Performed by: INTERNAL MEDICINE

## 2021-09-20 PROCEDURE — 93321 DOPPLER ECHO F-UP/LMTD STD: CPT | Performed by: INTERNAL MEDICINE

## 2021-09-20 PROCEDURE — 93325 DOPPLER ECHO COLOR FLOW MAPG: CPT | Performed by: INTERNAL MEDICINE

## 2021-09-20 PROCEDURE — A9500 TC99M SESTAMIBI: HCPCS | Performed by: SPECIALIST

## 2021-09-20 PROCEDURE — 93017 CV STRESS TEST TRACING ONLY: CPT | Performed by: INTERNAL MEDICINE

## 2021-09-20 PROCEDURE — 93308 TTE F-UP OR LMTD: CPT | Performed by: INTERNAL MEDICINE

## 2021-09-20 PROCEDURE — 93016 CV STRESS TEST SUPVJ ONLY: CPT | Performed by: INTERNAL MEDICINE

## 2021-09-20 RX ORDER — TETRAKIS(2-METHOXYISOBUTYLISOCYANIDE)COPPER(I) TETRAFLUOROBORATE 1 MG/ML
30 INJECTION, POWDER, LYOPHILIZED, FOR SOLUTION INTRAVENOUS ONCE
Status: COMPLETED | OUTPATIENT
Start: 2021-09-20 | End: 2021-09-20

## 2021-09-20 RX ORDER — TETRAKIS(2-METHOXYISOBUTYLISOCYANIDE)COPPER(I) TETRAFLUOROBORATE 1 MG/ML
10 INJECTION, POWDER, LYOPHILIZED, FOR SOLUTION INTRAVENOUS ONCE
Status: COMPLETED | OUTPATIENT
Start: 2021-09-20 | End: 2021-09-20

## 2021-09-20 RX ADMIN — REGADENOSON 0.4 MG: 0.08 INJECTION, SOLUTION INTRAVENOUS at 10:36

## 2021-09-20 RX ADMIN — TECHNETIUM TC 99M SESTAMIBI 26.4 MILLICURIE: 1 INJECTION, POWDER, FOR SOLUTION INTRAVENOUS at 10:30

## 2021-09-20 RX ADMIN — TECHNETIUM TC 99M SESTAMIBI 8.1 MILLICURIE: 1 INJECTION, POWDER, FOR SOLUTION INTRAVENOUS at 09:20

## 2021-09-21 LAB
ECHO AO ASC DIAM: 3.41 CM
ECHO AO ROOT DIAM: 3.4 CM
ECHO LA AREA 4C: 18.19 CM2
ECHO LA MAJOR AXIS: 4.41 CM
ECHO LA MINOR AXIS: 2.11 CM
ECHO LA VOL 2C: 66.37 ML (ref 18–58)
ECHO LA VOL 4C: 47.81 ML (ref 18–58)
ECHO LA VOL BP: 64.05 ML (ref 18–58)
ECHO LA VOL/BSA BIPLANE: 30.65 ML/M2 (ref 16–28)
ECHO LA VOLUME INDEX A2C: 31.76 ML/M2 (ref 16–28)
ECHO LA VOLUME INDEX A4C: 22.88 ML/M2 (ref 16–28)
ECHO LV EDV A2C: 126.57 ML
ECHO LV EDV A4C: 116.98 ML
ECHO LV EDV BP: 121.68 ML (ref 67–155)
ECHO LV EDV INDEX A4C: 56 ML/M2
ECHO LV EDV INDEX BP: 58.2 ML/M2
ECHO LV EDV NDEX A2C: 60.6 ML/M2
ECHO LV EJECTION FRACTION A2C: 61 PERCENT
ECHO LV EJECTION FRACTION A4C: 58 PERCENT
ECHO LV EJECTION FRACTION BIPLANE: 58.8 PERCENT (ref 55–100)
ECHO LV ESV A2C: 49.51 ML
ECHO LV ESV A4C: 48.66 ML
ECHO LV ESV BP: 50.11 ML (ref 22–58)
ECHO LV ESV INDEX A2C: 23.7 ML/M2
ECHO LV ESV INDEX A4C: 23.3 ML/M2
ECHO LV ESV INDEX BP: 24 ML/M2
ECHO LV INTERNAL DIMENSION DIASTOLIC: 4.42 CM (ref 4.2–5.9)
ECHO LV INTERNAL DIMENSION SYSTOLIC: 3.16 CM
ECHO LV IVSD: 0.95 CM (ref 0.6–1)
ECHO LV MASS 2D: 130.9 G (ref 88–224)
ECHO LV MASS INDEX 2D: 62.6 G/M2 (ref 49–115)
ECHO LV POSTERIOR WALL DIASTOLIC: 0.87 CM (ref 0.6–1)

## 2021-09-22 ENCOUNTER — HOSPITAL ENCOUNTER (OUTPATIENT)
Dept: CARDIAC REHAB | Age: 76
Discharge: HOME | End: 2021-09-22
Payer: MEDICARE

## 2021-09-22 VITALS — WEIGHT: 196 LBS | BODY MASS INDEX: 27.34 KG/M2

## 2021-09-22 PROCEDURE — 93797 PHYS/QHP OP CAR RHAB WO ECG: CPT

## 2021-09-22 PROCEDURE — 93798 PHYS/QHP OP CAR RHAB W/ECG: CPT

## 2021-09-23 ENCOUNTER — HOSPITAL ENCOUNTER (OUTPATIENT)
Dept: CARDIAC REHAB | Age: 76
Discharge: HOME | End: 2021-09-23
Payer: MEDICARE

## 2021-09-23 VITALS — WEIGHT: 196 LBS | BODY MASS INDEX: 27.34 KG/M2

## 2021-09-23 PROCEDURE — 93797 PHYS/QHP OP CAR RHAB WO ECG: CPT

## 2021-09-23 PROCEDURE — 93798 PHYS/QHP OP CAR RHAB W/ECG: CPT

## 2021-09-24 ENCOUNTER — TELEPHONE (OUTPATIENT)
Dept: CARDIOLOGY CLINIC | Age: 76
End: 2021-09-24

## 2021-09-24 NOTE — TELEPHONE ENCOUNTER
Patient is calling for his results from his procedures. Please give a call back.     Phone [phone]

## 2021-09-24 NOTE — TELEPHONE ENCOUNTER
Verified patient with two patient identifiers. Called patient and informed him the results of Echo and Nuclear stress test.Per Es Johnson.:  Echo with improved LVEF - prev 35-40%.  Now ~60% which is normal.  Wall motion also improved. Stress test - appears low risk / nml - however, Dr. Theresa Still will review images in detail when he returns. Nithin Salazar contact pt if any concerns.     This is all great news! Patient verbalized understanding.

## 2021-09-24 NOTE — TELEPHONE ENCOUNTER
09/20/21    ECHO ADULT FOLLOW-UP OR LIMITED 09/21/2021 9/21/2021    Interpretation Summary  · LV: Calculated LVEF is 59%. Biplane method used to measure ejection fraction. Normal cavity size, wall thickness and systolic function (ejection fraction normal). · Previously seen wall motion abnormality of the anteroapical wall has now recovered and global LVEF has improved from 40% to now 59%. · Limited, focused follow-up of LV systolic function    Signed by: Ted Delatorre MD on 9/21/2021  9:42 PM    09/20/21    NUCLEAR CARDIAC STRESS TEST 09/20/2021 9/20/2021    Interpretation Summary  · SPECT: Left ventricular function post-stress was normal. Calculated ejection fraction is 63%. There is no evidence of transient ischemic dilation (TID). · Baseline ECG: Normal sinus rhythm.   · Stress test: Negative stress test.  · SPECT: Left ventricular perfusion is probably normal. Myocardial perfusion imaging supports a low risk stress test.    Signed by: Eloy Isaacs MD on 9/20/2021  5:37 PM

## 2021-09-27 ENCOUNTER — HOSPITAL ENCOUNTER (OUTPATIENT)
Dept: CARDIAC REHAB | Age: 76
Discharge: HOME | End: 2021-09-27
Payer: MEDICARE

## 2021-09-27 VITALS — WEIGHT: 198.2 LBS | BODY MASS INDEX: 27.64 KG/M2

## 2021-09-27 PROCEDURE — 93798 PHYS/QHP OP CAR RHAB W/ECG: CPT

## 2021-09-27 NOTE — PROGRESS NOTES
Please let patient know that his echocardiogram shows normalization of his left ventricular function. Stress test does not show any residual ischemia. Recommend to continue his current medical therapy at this time.

## 2021-09-29 ENCOUNTER — HOSPITAL ENCOUNTER (OUTPATIENT)
Dept: CARDIAC REHAB | Age: 76
Discharge: HOME | End: 2021-09-29
Payer: MEDICARE

## 2021-09-29 VITALS — BODY MASS INDEX: 28.01 KG/M2 | WEIGHT: 200.8 LBS

## 2021-09-29 PROCEDURE — 93798 PHYS/QHP OP CAR RHAB W/ECG: CPT

## 2021-10-01 ENCOUNTER — HOSPITAL ENCOUNTER (OUTPATIENT)
Dept: CARDIAC REHAB | Age: 76
Discharge: HOME | End: 2021-10-01
Payer: MEDICARE

## 2021-10-01 VITALS — WEIGHT: 200 LBS | BODY MASS INDEX: 27.89 KG/M2

## 2021-10-01 PROCEDURE — 93798 PHYS/QHP OP CAR RHAB W/ECG: CPT

## 2021-10-04 ENCOUNTER — APPOINTMENT (OUTPATIENT)
Dept: CARDIAC REHAB | Age: 76
End: 2021-10-04
Payer: MEDICARE

## 2021-10-05 ENCOUNTER — HOSPITAL ENCOUNTER (OUTPATIENT)
Dept: CARDIAC REHAB | Age: 76
Discharge: HOME | End: 2021-10-05
Payer: MEDICARE

## 2021-10-05 VITALS — BODY MASS INDEX: 27.95 KG/M2 | WEIGHT: 200.4 LBS

## 2021-10-05 PROCEDURE — 93798 PHYS/QHP OP CAR RHAB W/ECG: CPT

## 2021-10-06 ENCOUNTER — HOSPITAL ENCOUNTER (OUTPATIENT)
Dept: CARDIAC REHAB | Age: 76
Discharge: HOME | End: 2021-10-06
Payer: MEDICARE

## 2021-10-06 VITALS — BODY MASS INDEX: 27.78 KG/M2 | WEIGHT: 199.2 LBS

## 2021-10-06 PROCEDURE — 93798 PHYS/QHP OP CAR RHAB W/ECG: CPT

## 2021-10-06 PROCEDURE — 93797 PHYS/QHP OP CAR RHAB WO ECG: CPT

## 2021-10-08 ENCOUNTER — HOSPITAL ENCOUNTER (OUTPATIENT)
Dept: CARDIAC REHAB | Age: 76
Discharge: HOME | End: 2021-10-08
Payer: MEDICARE

## 2021-10-08 VITALS — WEIGHT: 200.4 LBS | BODY MASS INDEX: 27.95 KG/M2

## 2021-10-08 PROCEDURE — 93798 PHYS/QHP OP CAR RHAB W/ECG: CPT

## 2021-10-11 ENCOUNTER — HOSPITAL ENCOUNTER (OUTPATIENT)
Dept: CARDIAC REHAB | Age: 76
Discharge: HOME | End: 2021-10-11
Payer: MEDICARE

## 2021-10-11 VITALS — WEIGHT: 202 LBS | BODY MASS INDEX: 28.17 KG/M2

## 2021-10-11 PROCEDURE — 93798 PHYS/QHP OP CAR RHAB W/ECG: CPT

## 2021-10-13 ENCOUNTER — HOSPITAL ENCOUNTER (OUTPATIENT)
Dept: CARDIAC REHAB | Age: 76
Discharge: HOME | End: 2021-10-13
Payer: MEDICARE

## 2021-10-13 VITALS — BODY MASS INDEX: 28.09 KG/M2 | WEIGHT: 201.4 LBS

## 2021-10-13 PROCEDURE — 93798 PHYS/QHP OP CAR RHAB W/ECG: CPT

## 2021-10-15 ENCOUNTER — HOSPITAL ENCOUNTER (OUTPATIENT)
Dept: CARDIAC REHAB | Age: 76
Discharge: HOME | End: 2021-10-15
Payer: MEDICARE

## 2021-10-15 VITALS — BODY MASS INDEX: 28.51 KG/M2 | WEIGHT: 204.4 LBS

## 2021-10-15 PROCEDURE — 93798 PHYS/QHP OP CAR RHAB W/ECG: CPT

## 2021-10-18 ENCOUNTER — HOSPITAL ENCOUNTER (OUTPATIENT)
Dept: CARDIAC REHAB | Age: 76
Discharge: HOME | End: 2021-10-18
Payer: MEDICARE

## 2021-10-18 VITALS — BODY MASS INDEX: 28.15 KG/M2 | WEIGHT: 201.8 LBS

## 2021-10-18 PROCEDURE — 93798 PHYS/QHP OP CAR RHAB W/ECG: CPT

## 2021-10-20 ENCOUNTER — HOSPITAL ENCOUNTER (OUTPATIENT)
Dept: CARDIAC REHAB | Age: 76
Discharge: HOME | End: 2021-10-20
Payer: MEDICARE

## 2021-10-20 VITALS — BODY MASS INDEX: 27.98 KG/M2 | WEIGHT: 200.6 LBS

## 2021-10-20 PROCEDURE — 93798 PHYS/QHP OP CAR RHAB W/ECG: CPT

## 2021-10-22 ENCOUNTER — HOSPITAL ENCOUNTER (OUTPATIENT)
Dept: CARDIAC REHAB | Age: 76
Discharge: HOME | End: 2021-10-22
Payer: MEDICARE

## 2021-10-22 VITALS — BODY MASS INDEX: 27.98 KG/M2 | WEIGHT: 200.6 LBS

## 2021-10-22 PROCEDURE — 93798 PHYS/QHP OP CAR RHAB W/ECG: CPT

## 2021-11-01 ENCOUNTER — HOSPITAL ENCOUNTER (OUTPATIENT)
Dept: CARDIAC REHAB | Age: 76
Discharge: HOME | End: 2021-11-01
Payer: MEDICARE

## 2021-11-01 VITALS — WEIGHT: 202.6 LBS | BODY MASS INDEX: 28.26 KG/M2

## 2021-11-01 PROCEDURE — 93798 PHYS/QHP OP CAR RHAB W/ECG: CPT

## 2021-11-04 ENCOUNTER — HOSPITAL ENCOUNTER (OUTPATIENT)
Dept: CARDIAC REHAB | Age: 76
Discharge: HOME | End: 2021-11-04
Payer: MEDICARE

## 2021-11-04 VITALS — BODY MASS INDEX: 28.09 KG/M2 | WEIGHT: 201.4 LBS

## 2021-11-04 PROCEDURE — 93798 PHYS/QHP OP CAR RHAB W/ECG: CPT

## 2021-11-05 ENCOUNTER — HOSPITAL ENCOUNTER (OUTPATIENT)
Dept: CARDIAC REHAB | Age: 76
Discharge: HOME | End: 2021-11-05
Payer: MEDICARE

## 2021-11-05 VITALS — BODY MASS INDEX: 28.28 KG/M2 | WEIGHT: 202.8 LBS

## 2021-11-05 PROCEDURE — 93798 PHYS/QHP OP CAR RHAB W/ECG: CPT

## 2021-11-08 ENCOUNTER — HOSPITAL ENCOUNTER (OUTPATIENT)
Dept: CARDIAC REHAB | Age: 76
Discharge: HOME | End: 2021-11-08
Payer: MEDICARE

## 2021-11-08 VITALS — WEIGHT: 203.8 LBS | BODY MASS INDEX: 28.42 KG/M2

## 2021-11-08 PROCEDURE — 93798 PHYS/QHP OP CAR RHAB W/ECG: CPT

## 2021-11-10 ENCOUNTER — HOSPITAL ENCOUNTER (OUTPATIENT)
Dept: CARDIAC REHAB | Age: 76
Discharge: HOME | End: 2021-11-10
Payer: MEDICARE

## 2021-11-10 VITALS — WEIGHT: 202.8 LBS | BODY MASS INDEX: 28.28 KG/M2

## 2021-11-10 PROCEDURE — 93798 PHYS/QHP OP CAR RHAB W/ECG: CPT

## 2021-11-10 PROCEDURE — 93797 PHYS/QHP OP CAR RHAB WO ECG: CPT

## 2021-11-12 ENCOUNTER — HOSPITAL ENCOUNTER (OUTPATIENT)
Dept: CARDIAC REHAB | Age: 76
Discharge: HOME | End: 2021-11-12
Payer: MEDICARE

## 2021-11-12 VITALS — BODY MASS INDEX: 28.26 KG/M2 | WEIGHT: 202.6 LBS

## 2021-11-12 PROCEDURE — 93798 PHYS/QHP OP CAR RHAB W/ECG: CPT

## 2021-11-15 ENCOUNTER — HOSPITAL ENCOUNTER (OUTPATIENT)
Dept: CARDIAC REHAB | Age: 76
Discharge: HOME | End: 2021-11-15
Payer: MEDICARE

## 2021-11-15 VITALS — WEIGHT: 204.8 LBS | BODY MASS INDEX: 28.56 KG/M2

## 2021-11-15 PROCEDURE — 93798 PHYS/QHP OP CAR RHAB W/ECG: CPT

## 2021-11-18 ENCOUNTER — HOSPITAL ENCOUNTER (OUTPATIENT)
Dept: CARDIAC REHAB | Age: 76
Discharge: HOME | End: 2021-11-18
Payer: MEDICARE

## 2021-11-18 VITALS — WEIGHT: 203.6 LBS | BODY MASS INDEX: 28.4 KG/M2

## 2021-11-18 PROCEDURE — 93797 PHYS/QHP OP CAR RHAB WO ECG: CPT

## 2021-11-18 PROCEDURE — 93798 PHYS/QHP OP CAR RHAB W/ECG: CPT

## 2021-11-19 ENCOUNTER — HOSPITAL ENCOUNTER (OUTPATIENT)
Dept: CARDIAC REHAB | Age: 76
Discharge: HOME | End: 2021-11-19
Payer: MEDICARE

## 2021-11-19 VITALS — BODY MASS INDEX: 28.34 KG/M2 | WEIGHT: 203.2 LBS

## 2021-11-19 PROCEDURE — 93798 PHYS/QHP OP CAR RHAB W/ECG: CPT

## 2021-11-19 NOTE — CARDIO/PULMONARY
[facility] Cardiopulmonary Rehab Program Completion:    Emeterio Tamayo   1945 completed phase II cardiac rehab. He attended 36 sessions from -. Mr. Ivan Sanatmaria is interested in maintaining optimal health and will continue to work with Dr Estela Moreland and PCP. Patient has improved his endurance and stamina through regular exercise. Blood pressure readings were typically within normal limits. Pt exercised up to 46 minutes at peak METS 5.0 on rower. Patient has improved nutrition, [facility], cardiac knowledge scores. PHQ9 depression score was stable at 1. Mr. Ivan Santamaria plans to continue exercising at home, and has set revised goals that include walking 3 to 5 times a week for at least 30 minutes, yard work and joining the [facility]. Lakisha Plascencia Pt needs to continue working on making healthy food choices & weight loss. He has made significant improvements in his dietary choices, cutting out most red meat and increasing his fruit & vegetable intake. He was encouraged to f/u with his PCP and cardiologist as scheduled.

## 2021-11-29 ENCOUNTER — OFFICE VISIT (OUTPATIENT)
Dept: CARDIOLOGY CLINIC | Age: 76
End: 2021-11-29
Payer: MEDICARE

## 2021-11-29 VITALS
HEIGHT: 71 IN | OXYGEN SATURATION: 99 % | WEIGHT: 203 LBS | BODY MASS INDEX: 28.42 KG/M2 | HEART RATE: 80 BPM | SYSTOLIC BLOOD PRESSURE: 138 MMHG | DIASTOLIC BLOOD PRESSURE: 68 MMHG

## 2021-11-29 DIAGNOSIS — I50.20 HFREF (HEART FAILURE WITH REDUCED EJECTION FRACTION) (HCC): ICD-10-CM

## 2021-11-29 DIAGNOSIS — I25.118 CORONARY ARTERY DISEASE OF NATIVE ARTERY OF NATIVE HEART WITH STABLE ANGINA PECTORIS (HCC): Primary | ICD-10-CM

## 2021-11-29 DIAGNOSIS — I10 ESSENTIAL HYPERTENSION: ICD-10-CM

## 2021-11-29 DIAGNOSIS — E78.5 HYPERLIPIDEMIA, UNSPECIFIED HYPERLIPIDEMIA TYPE: ICD-10-CM

## 2021-11-29 PROCEDURE — G0463 HOSPITAL OUTPT CLINIC VISIT: HCPCS | Performed by: STUDENT IN AN ORGANIZED HEALTH CARE EDUCATION/TRAINING PROGRAM

## 2021-11-29 PROCEDURE — 99214 OFFICE O/P EST MOD 30 MIN: CPT | Performed by: STUDENT IN AN ORGANIZED HEALTH CARE EDUCATION/TRAINING PROGRAM

## 2021-11-29 RX ORDER — CLOPIDOGREL BISULFATE 75 MG/1
75 TABLET ORAL DAILY
Qty: 90 TABLET | Refills: 2 | Status: SHIPPED | OUTPATIENT
Start: 2021-11-29

## 2021-11-29 NOTE — PROGRESS NOTES
Benjamín Lind is a 77 y.o. male    Chief Complaint   Patient presents with    Follow-up     3 month f/u HFrEF, NSTEMI    Coronary Artery Disease    Hypertension       Chest pain No    SOB No    Dizziness No    Swelling No    Refills No    Visit Vitals  /68 (BP 1 Location: Left upper arm, BP Patient Position: Sitting)   Pulse 80   Ht 5' 11\" (1.803 m)   Wt 203 lb (92.1 kg)   SpO2 99%   BMI 28.31 kg/m²       1. Have you been to the ER, urgent care clinic since your last visit? Hospitalized since your last visit? No    2. Have you seen or consulted any other health care providers outside of the [address] [place] [address] since your last visit? Include any pap smears or colon screening.   No

## 2021-11-29 NOTE — LETTER
11/29/2021    Patient: Maricarmen Webber   YOB: 1945   Date of Visit: 11/29/2021     Reinier Ritchie, [facility] [phone]  Via Fax: [phone]    Dear Reinier Ritchie DO,      Thank you for referring Mr. Maricarmen Webber to [facility] for evaluation. My notes for this consultation are attached. If you have questions, please do not hesitate to call me. I look forward to following your patient along with you.       Sincerely,    Giuliano Gutierrez DO

## 2021-11-29 NOTE — PROGRESS NOTES
[facility] [address], [address] [facility], [address] [place] [address]    Office [phone],IVB [phone]           Mari Sarkar is a 77 y.o. male  Presents to office for f/up evaluation      Assessment/Recommendations:      ICD-10-CM ICD-9-CM    1. Coronary artery disease of native artery of native heart with stable angina pectoris ([facility])  I25.118 414.01      413.9    2. HFrEF (heart failure with reduced ejection fraction) ([facility])  I50.20 428.20    3. Essential hypertension  I10 401.9    4. Hyperlipidemia, unspecified hyperlipidemia type  E78.5 272.4            Coronary artery disease, NSTEMI 7/22/21. · Acute coronary syndrome with critical stenosis of mid LAD with evidence of severe stenosis of AV groove circumflex with moderate disease of the ostial right coronary artery  · Mid LAD stented with 3.0 x 23 mm Xience Nandini QUINN optimized with 3.5 noncompliant balloon  · AV groove circumflex stented with 3.5X 23 mm Xience Nandini QUINN, optimized with 3.75 noncompliant balloon    -Continue DAPT. From Brilinta to Plavix. Continue Plavix for 1 year. Recommend to discontinue 7/22, safe to discontinue sooner if he develops severe adverse bleeding.  -Continue lisinopril to 10 mg daily  -Continue metoprolol succinate 25 mg daily  -Continue atorvastatin 80 mg daily. LDL at time of myocardial infarction 124, decreased to 18 g/dL with atorvastatin 80 mg daily. Recommend to continue statin therapy. Hyperlipidemiacontinue statin therapy      HFrEF, moderate LV dysfunction at the time of acute myocardial infarction 7/21. LVEF normalized with revascularization and medical therapy.  Continue goal-directed medical therapy with ACE inhibitor and beta-blocker therapy. Recommend obtaining comprehensive metabolic panel, CBC and lipid panel with primary care physician, scheduled for annual physical spring 2021.   Kindly asked patient to bring his lab results from his primary care physician to his follow-up appointment. Primary Care Physician- Nissa Beckham DO    Follow-up 6 months      Subjective:  77 y.o. presents the office for follow-up visit. CAD hx  He presented to the ED 7/21 with acute onset of chest pain. Found to have critical stenosis within mid LAD along with severe stenosis in proximal circumflex. Underwent PCI of his mid LAD along with proximal circumflex. Also has moderate ostial right coronary artery disease. Echocardiogram showed moderate reduced LVEF 35 to 40% with wall motion abnormalities within the LAD territory. His last visit he underwent repeat echocardiogram and ischemic evaluation for inferior wall ischemia. LV function has normalized after revascularization and medical therapy. No evidence of ischemia within his inferior wall. Continues to exercise regularly, just finished cardiac rehab. No ongoing chest pain or chest pressure symptoms. He previously did note some shortness of breath when he was at altitude in [place], which is new for him. Adverse bleeding with DAPT. Current Outpatient Medications:     clopidogreL (Plavix) 75 mg tab, Take 1 Tablet by mouth daily. , Disp: 90 Tablet, Rfl: 2    lisinopriL (PRINIVIL, ZESTRIL) 10 mg tablet, Take 1 Tablet by mouth daily. , Disp: 90 Tablet, Rfl: 3    atorvastatin (LIPITOR) 80 mg tablet, Take 1 Tablet by mouth nightly., Disp: 90 Tablet, Rfl: 3    metoprolol succinate (TOPROL-XL) 25 mg XL tablet, Take 1 Tablet by mouth daily. , Disp: 90 Tablet, Rfl: 3    aspirin 81 mg chewable tablet, Take 1 Tablet by mouth daily. , Disp: 1 Tablet, Rfl: 1    Allergies   Allergen Reactions    Keflex [Cephalexin] Rash        Family History   Problem Relation Age of Onset    Cancer Mother     Hypertension Father     Cancer Father        Social History     Tobacco Use    Smoking status: Never Smoker    Smokeless tobacco: Never Used   Vaping Use    Vaping Use: Never used   Substance Use Topics    Alcohol use: Yes     Alcohol/week: 2.0 standard drinks     Types: 1 Glasses of wine, 1 Cans of beer per week     Comment: daily    Drug use: Never       Review of Symptoms:  Pertinent Positive: neg  Pertinent Negative:No chest pain, dyspnea on exertion, shortness of breath, orthopnea, PND    All Other systems reviewed and are negative for a Comprehensive ROS (10+)    Physical Exam    Blood pressure 138/68, pulse 80, height 5' 11\" (1.803 m), weight 203 lb (92.1 kg), SpO2 99 %. Constitutional:  well-developed and well-nourished. No distress. HENT: Normocephalic. Eyes: No scleral icterus. Neck:  Neck supple. No JVD present. Pulmonary/Chest: Effort normal and breath sounds normal. No respiratory distress, wheezes or rales. Cardiovascular: Normal rate, regular rhythm, S1 S2 . Exam reveals no gallop and no friction rub. No murmur heard. No edema. Extremities:  Normal muscle tone  Abdominal:   No abnormal distension. Neurological:  Moving all extremities, cranial nerves appear grossly intact. Skin: Skin is not cold. Not diaphoretic. No erythema. Psychiatric:  Grossly normal mood and affect. Intact insight. Objective Data:     Investigations personally reviewed and interpreted            Investigations reviewed     07/23/21    CARDIAC PROCEDURE 07/25/2021 7/25/2021  Findings:  L Main: Large caliber vessel, short, mild disease  LAD: Large caliber vessel, proximal mild disease, mid vessel hazy 90% flow-limiting stenosis with subsequent distal 40% stenosis, small D1, moderate D2 with diffuse mild to moderate disease  LCx: Large caliber vessel, AV groove circumflex 70 to 80% stenosis, several high small caliber obtuse marginals, OM2 with a proximal tubular 50 to 60% stenosis, OM 3 diffuse mild disease  RCA: Large-caliber vessel, ostial 60% stenosis, diffuse mild disease throughout right coronary artery, moderate distal right coronary artery disease, small PL branch and small PDA with diffuse mild disease    LVEDP:  5 mmhg    PCI:  EBU 3 5, 6F  Systemic heparinization    LAD  Jorge blue passed into distal LAD. Mid LAD dilated with 2.5 compliant balloon. IVUS used for sizing. Mid LAD just distal to D2 was stented with a 3.0 x 23 mm Xience Nandini QUINN, optimized with 3.5 noncompliant balloon at 16 to 20 fadi. IVUS showed well opposed stent. No evidence of dissection or perforation. JAQUI-3 flow into distal vessel post PCI    Circumflex  Jorge blue remained in the LAD. Run-through was passed into distal circumflex. Circumflex was dilated with 3.5 noncompliant balloon. IVUS was used for vessel sizing. AV groove circumflex was stented with a 3.5X 23 mm Xience Nandini QUINN. Stent was optimized with a 3.75 NC balloon at 16 to 20 fadi. Required stuart wire in order to pass noncompliant balloon into circumflex. No evidence of dissection or perforation. JAQUI-3 flow into distal circumflex post PCI. ECHO ADULT COMPLETE 07/23/2021    Interpretation Summary  · LV: Estimated LVEF is 35 - 40%. Normal cavity size. Upper normal wall thickness. Moderately and segmentally reduced systolic function. Left ventricular diastolic dysfunction. · AV: Aortic valve leaflet calcification present without reduced excursion. Mild aortic valve regurgitation is present. · MV: Mitral valve leaflet calcification. · RA: Mildly dilated right atrium. 09/20/21    ECHO ADULT FOLLOW-UP OR LIMITED 09/21/2021 9/21/2021    Interpretation Summary  · LV: Calculated LVEF is 59%. Biplane method used to measure ejection fraction. Normal cavity size, wall thickness and systolic function (ejection fraction normal). · Previously seen wall motion abnormality of the anteroapical wall has now recovered and global LVEF has improved from 40% to now 59%.   · Limited, focused follow-up of LV systolic function    Signed by: Jarred Herndon MD on 9/21/2021  9:42 PM    09/20/21    NUCLEAR CARDIAC STRESS TEST 09/20/2021 10/4/2021    Interpretation Summary  · SPECT: Left ventricular function post-stress was normal. Calculated ejection fraction is 63%. There is no evidence of transient ischemic dilation (TID). · Baseline ECG: Normal sinus rhythm. · Stress test: Negative stress test.  · SPECT: Left ventricular perfusion is probably normal. Myocardial perfusion imaging supports a low risk stress test.    Signed by: Sumi Cardoso MD on 9/20/2021  5:37 PM            [address], DO          ATTENTION:   This medical record was transcribed using an electronic medical records/speech recognition system. Although proofread, it may and can contain electronic, spelling and other errors. Corrections may be executed at a later time. Please feel free to contact us for any clarifications as needed.

## 2022-03-19 PROBLEM — I21.3 STEMI (ST ELEVATION MYOCARDIAL INFARCTION) (HCC): Status: ACTIVE | Noted: 2021-07-23

## 2022-05-20 ENCOUNTER — OFFICE VISIT (OUTPATIENT)
Dept: CARDIOLOGY CLINIC | Age: 77
End: 2022-05-20
Payer: MEDICARE

## 2022-05-20 VITALS
WEIGHT: 205 LBS | HEART RATE: 80 BPM | HEIGHT: 71 IN | DIASTOLIC BLOOD PRESSURE: 64 MMHG | BODY MASS INDEX: 28.7 KG/M2 | SYSTOLIC BLOOD PRESSURE: 128 MMHG | OXYGEN SATURATION: 96 %

## 2022-05-20 DIAGNOSIS — I10 ESSENTIAL HYPERTENSION: ICD-10-CM

## 2022-05-20 DIAGNOSIS — I25.118 CORONARY ARTERY DISEASE OF NATIVE ARTERY OF NATIVE HEART WITH STABLE ANGINA PECTORIS (HCC): Primary | ICD-10-CM

## 2022-05-20 DIAGNOSIS — E78.5 HYPERLIPIDEMIA, UNSPECIFIED HYPERLIPIDEMIA TYPE: ICD-10-CM

## 2022-05-20 DIAGNOSIS — I25.2 HISTORY OF MYOCARDIAL INFARCTION: ICD-10-CM

## 2022-05-20 DIAGNOSIS — E11.69 TYPE 2 DIABETES MELLITUS WITH OTHER SPECIFIED COMPLICATION, WITHOUT LONG-TERM CURRENT USE OF INSULIN (HCC): ICD-10-CM

## 2022-05-20 PROCEDURE — G8510 SCR DEP NEG, NO PLAN REQD: HCPCS | Performed by: STUDENT IN AN ORGANIZED HEALTH CARE EDUCATION/TRAINING PROGRAM

## 2022-05-20 PROCEDURE — 99214 OFFICE O/P EST MOD 30 MIN: CPT | Performed by: STUDENT IN AN ORGANIZED HEALTH CARE EDUCATION/TRAINING PROGRAM

## 2022-05-20 PROCEDURE — G8754 DIAS BP LESS 90: HCPCS | Performed by: STUDENT IN AN ORGANIZED HEALTH CARE EDUCATION/TRAINING PROGRAM

## 2022-05-20 PROCEDURE — G8752 SYS BP LESS 140: HCPCS | Performed by: STUDENT IN AN ORGANIZED HEALTH CARE EDUCATION/TRAINING PROGRAM

## 2022-05-20 PROCEDURE — 1101F PT FALLS ASSESS-DOCD LE1/YR: CPT | Performed by: STUDENT IN AN ORGANIZED HEALTH CARE EDUCATION/TRAINING PROGRAM

## 2022-05-20 PROCEDURE — G8536 NO DOC ELDER MAL SCRN: HCPCS | Performed by: STUDENT IN AN ORGANIZED HEALTH CARE EDUCATION/TRAINING PROGRAM

## 2022-05-20 PROCEDURE — G8427 DOCREV CUR MEDS BY ELIG CLIN: HCPCS | Performed by: STUDENT IN AN ORGANIZED HEALTH CARE EDUCATION/TRAINING PROGRAM

## 2022-05-20 PROCEDURE — G0463 HOSPITAL OUTPT CLINIC VISIT: HCPCS | Performed by: STUDENT IN AN ORGANIZED HEALTH CARE EDUCATION/TRAINING PROGRAM

## 2022-05-20 PROCEDURE — G8419 CALC BMI OUT NRM PARAM NOF/U: HCPCS | Performed by: STUDENT IN AN ORGANIZED HEALTH CARE EDUCATION/TRAINING PROGRAM

## 2022-05-20 NOTE — PROGRESS NOTES
[facility] [address], [address] [facility], [id number] [place] [address]    Office [phone],EEX [phone]           Nicky Tejeda is a 69 y.o. male  Presents to office for f/up evaluation      Assessment/Recommendations:      ICD-10-CM ICD-9-CM    1. Coronary artery disease of native artery of native heart with stable angina pectoris ([facility] [address]  I25.118 414.01      413.9    2. History of myocardial infarction  I25.2 412    3. Essential hypertension  I10 401.9    4. Hyperlipidemia, unspecified hyperlipidemia type  E78.5 272.4    5. Type 2 diabetes mellitus with other specified complication, without long-term current use of insulin ([facility])  E11.69 250.80            Coronary artery disease, NSTEMI 7/22/21. · Acute coronary syndrome with critical stenosis of mid LAD with evidence of severe stenosis of AV groove circumflex with moderate disease of the ostial right coronary artery  · Mid LAD stented with 3.0 x 23 mm Xience Nandini QUINN optimized with 3.5 noncompliant balloon  · AV groove circumflex stented with 3.5X 23 mm Xience Nandini QUINN, optimized with 3.75 noncompliant balloon    -Continue DAPT. Previously changed Brilinta to Plavix. Continue Plavix for 1 year. When he finishes current 3-month supply of Plavix and then subsequently discontinue.  -Continue lisinopril to 10 mg daily  -Continue metoprolol succinate 25 mg daily  -Continue atorvastatin 80 mg daily. LDL improved to 19 with statin therapy. Hyperlipidemiacontinue statin therapy. Lipids 5/2022 shows total cholesterol of 85, triglycerides 80, HDL 41, LDL 28.  Stable liver enzymes. HFrEF, moderate LV dysfunction at the time of acute myocardial infarction 7/21. LVEF normalized with revascularization and medical therapy.  Continue goal-directed medical therapy with ACE inhibitor and beta-blocker therapy. T2DM-new diagnosis. Hemoglobin A1c 513 2020 to 7%, previously 6.3-6.5%.    When he start Jardiance 10 mg daily. Patient was previously treating his diabetes with berberine but discontinued when he had his myocardial infarction. He would like to retrial as berberine and recheck his A1c in a few months with Dr. Asia Stewart. I provided literature for patient to review on the cardiovascular benefits of Jardiance    Primary Care Physician- Adolph Hunter, DO    Follow-up next months      Subjective:  69 y.o. presents the office for follow-up visit. CAD hx  He presented to the ED 7/21 with acute onset of chest pain. Found to have critical stenosis within mid LAD along with severe stenosis in proximal circumflex. Underwent PCI of his mid LAD along with proximal circumflex. Also has moderate ostial right coronary artery disease. Echocardiogram showed moderate reduced LVEF 35 to 40% with wall motion abnormalities within the LAD territory. Clinically doing well. No ongoing chest pain or chest pressure symptoms. No exertional dyspnea. No adverse bleeding with DAPT. His A1c increased to 7%. His most recent LDL is 19 on atorvastatin 80 mg daily. He is surprised that his A1c is increased to 7%. He has actually lost 10 pounds and his diet has not changed. Was previously using berberine prior to his heart attack manages insulin resistance. Stopped at the time of his heart attack        Current Outpatient Medications:     clopidogreL (Plavix) 75 mg tab, Take 1 Tablet by mouth daily. , Disp: 90 Tablet, Rfl: 2    lisinopriL (PRINIVIL, ZESTRIL) 10 mg tablet, Take 1 Tablet by mouth daily. , Disp: 90 Tablet, Rfl: 3    atorvastatin (LIPITOR) 80 mg tablet, Take 1 Tablet by mouth nightly., Disp: 90 Tablet, Rfl: 3    metoprolol succinate (TOPROL-XL) 25 mg XL tablet, Take 1 Tablet by mouth daily. , Disp: 90 Tablet, Rfl: 3    aspirin 81 mg chewable tablet, Take 1 Tablet by mouth daily. , Disp: 1 Tablet, Rfl: 1    Allergies   Allergen Reactions    Keflex [Cephalexin] Rash        Family History   Problem Relation Age of Onset    Cancer Mother     Hypertension Father     Cancer Father        Social History     Tobacco Use    Smoking status: Never Smoker    Smokeless tobacco: Never Used   Vaping Use    Vaping Use: Never used   Substance Use Topics    Alcohol use: Yes     Alcohol/week: 2.0 standard drinks     Types: 1 Glasses of wine, 1 Cans of beer per week     Comment: daily    Drug use: Never       Review of Symptoms:  Pertinent Positive: neg  Pertinent Negative:No chest pain, dyspnea on exertion, shortness of breath, orthopnea, PND    All Other systems reviewed and are negative for a Comprehensive ROS (10+)    Physical Exam    Blood pressure 128/64, pulse 80, height 5' 11\" (1.803 m), weight 205 lb (93 kg), SpO2 96 %. Constitutional:  well-developed and well-nourished. No distress. HENT: Normocephalic. Eyes: No scleral icterus. Neck:  Neck supple. No JVD present. Pulmonary/Chest: Effort normal and breath sounds normal. No respiratory distress, wheezes or rales. Cardiovascular: Normal rate, regular rhythm, S1 S2 . Exam reveals no gallop and no friction rub. No murmur heard. No edema. Extremities:  Normal muscle tone  Abdominal:   No abnormal distension. Neurological:  Moving all extremities, cranial nerves appear grossly intact. Skin: Skin is not cold. Not diaphoretic. No erythema. Psychiatric:  Grossly normal mood and affect. Intact insight. Objective Data:     Investigations personally reviewed and interpreted            Investigations reviewed     07/23/21    CARDIAC PROCEDURE 07/25/2021 7/25/2021  Findings:  L Main: Large caliber vessel, short, mild disease  LAD: Large caliber vessel, proximal mild disease, mid vessel hazy 90% flow-limiting stenosis with subsequent distal 40% stenosis, small D1, moderate D2 with diffuse mild to moderate disease  LCx: Large caliber vessel, AV groove circumflex 70 to 80% stenosis, several high small caliber obtuse marginals, OM2 with a proximal tubular 50 to 60% stenosis, OM 3 diffuse mild disease  RCA: Large-caliber vessel, ostial 60% stenosis, diffuse mild disease throughout right coronary artery, moderate distal right coronary artery disease, small PL branch and small PDA with diffuse mild disease    LVEDP:  5 mmhg    PCI:  EBU 3 5, 6F  Systemic heparinization    LAD  Jorge blue passed into distal LAD. Mid LAD dilated with 2.5 compliant balloon. IVUS used for sizing. Mid LAD just distal to D2 was stented with a 3.0 x 23 mm Xience Nandini QUINN, optimized with 3.5 noncompliant balloon at 16 to 20 fadi. IVUS showed well opposed stent. No evidence of dissection or perforation. JAQUI-3 flow into distal vessel post PCI    Circumflex  Jorge blue remained in the LAD. Run-through was passed into distal circumflex. Circumflex was dilated with 3.5 noncompliant balloon. IVUS was used for vessel sizing. AV groove circumflex was stented with a 3.5X 23 mm Xience Nandini QUINN. Stent was optimized with a 3.75 NC balloon at 16 to 20 fadi. Required stuart wire in order to pass noncompliant balloon into circumflex. No evidence of dissection or perforation. JAQUI-3 flow into distal circumflex post PCI. ECHO ADULT COMPLETE 07/23/2021    Interpretation Summary  · LV: Estimated LVEF is 35 - 40%. Normal cavity size. Upper normal wall thickness. Moderately and segmentally reduced systolic function. Left ventricular diastolic dysfunction. · AV: Aortic valve leaflet calcification present without reduced excursion. Mild aortic valve regurgitation is present. · MV: Mitral valve leaflet calcification. · RA: Mildly dilated right atrium. 09/20/21    ECHO ADULT FOLLOW-UP OR LIMITED 09/21/2021 9/21/2021    Interpretation Summary  · LV: Calculated LVEF is 59%. Biplane method used to measure ejection fraction. Normal cavity size, wall thickness and systolic function (ejection fraction normal).   · Previously seen wall motion abnormality of the anteroapical wall has now recovered and global LVEF has improved from 40% to now 59%. · Limited, focused follow-up of LV systolic function    Signed by: Robert Holland MD on 9/21/2021  9:42 PM    09/20/21    NUCLEAR CARDIAC STRESS TEST 09/20/2021 10/4/2021    Interpretation Summary  · SPECT: Left ventricular function post-stress was normal. Calculated ejection fraction is 63%. There is no evidence of transient ischemic dilation (TID). · Baseline ECG: Normal sinus rhythm. · Stress test: Negative stress test.  · SPECT: Left ventricular perfusion is probably normal. Myocardial perfusion imaging supports a low risk stress test.    Signed by: Reji Anthony MD on 9/20/2021  5:37 PM            [address] Arita, DO          ATTENTION:   This medical record was transcribed using an electronic medical records/speech recognition system. Although proofread, it may and can contain electronic, spelling and other errors. Corrections may be executed at a later time. Please feel free to contact us for any clarifications as needed.

## 2022-05-20 NOTE — LETTER
5/20/2022    Patient: Wayne Cheadle   YOB: 1945   Date of Visit: 5/20/2022     Dominiuqe Olivarez, [facility] [address]  Via Fax: [phone]    Dear Dominique Olivarez DO,      Thank you for referring Mr. Wayne Cheadle to [facility] for evaluation. My notes for this consultation are attached. If you have questions, please do not hesitate to call me. I look forward to following your patient along with you.       Sincerely,    Jamila Huff, DO

## 2022-07-21 RX ORDER — LISINOPRIL 10 MG/1
TABLET ORAL
Qty: 90 TABLET | Refills: 1 | Status: SHIPPED | OUTPATIENT
Start: 2022-07-21

## 2022-07-21 NOTE — TELEPHONE ENCOUNTER
Refill per VO of  Dr Mikaela Brown DO  Last appt: 5/20/2022  Future Appointments   Date Time Provider Shaun Fang   11/21/2022  9:20 AM DO RADHA SalcidoSF BS AMB     Requested Prescriptions     Pending Prescriptions Disp Refills    lisinopriL (PRINIVIL, ZESTRIL) 10 mg tablet [Pharmacy Med Name: LISINOPRIL 10 MG TABLET] 90 Tablet 3     Sig: TAKE 1 TABLET BY MOUTH EVERY DAY

## 2022-08-09 RX ORDER — METOPROLOL SUCCINATE 25 MG/1
TABLET, EXTENDED RELEASE ORAL
Qty: 90 TABLET | Refills: 1 | Status: SHIPPED | OUTPATIENT
Start: 2022-08-09

## 2022-08-09 RX ORDER — ATORVASTATIN CALCIUM 80 MG/1
TABLET, FILM COATED ORAL
Qty: 90 TABLET | Refills: 1 | Status: SHIPPED | OUTPATIENT
Start: 2022-08-09

## 2022-08-09 NOTE — TELEPHONE ENCOUNTER
Patient is calling because he needs a refill on his medication and he will be out of town for 3 weeks. Patient will not be near a pharmacy. Pharmacy sent over a request for the patient's medication also.     [phone] home

## 2023-01-06 ENCOUNTER — OFFICE VISIT (OUTPATIENT)
Dept: CARDIOLOGY CLINIC | Age: 78
End: 2023-01-06
Payer: MEDICARE

## 2023-01-06 VITALS
HEART RATE: 70 BPM | OXYGEN SATURATION: 92 % | SYSTOLIC BLOOD PRESSURE: 136 MMHG | BODY MASS INDEX: 28.14 KG/M2 | WEIGHT: 201 LBS | DIASTOLIC BLOOD PRESSURE: 74 MMHG | HEIGHT: 71 IN

## 2023-01-06 DIAGNOSIS — I25.2 HISTORY OF MYOCARDIAL INFARCTION: ICD-10-CM

## 2023-01-06 DIAGNOSIS — E78.5 HYPERLIPIDEMIA, UNSPECIFIED HYPERLIPIDEMIA TYPE: ICD-10-CM

## 2023-01-06 DIAGNOSIS — I10 ESSENTIAL HYPERTENSION: ICD-10-CM

## 2023-01-06 DIAGNOSIS — E11.69 TYPE 2 DIABETES MELLITUS WITH OTHER SPECIFIED COMPLICATION, WITHOUT LONG-TERM CURRENT USE OF INSULIN (HCC): ICD-10-CM

## 2023-01-06 DIAGNOSIS — I25.10 CORONARY ARTERY DISEASE INVOLVING NATIVE CORONARY ARTERY OF NATIVE HEART WITHOUT ANGINA PECTORIS: Primary | ICD-10-CM

## 2023-01-06 NOTE — PROGRESS NOTES
[facility] [address], [address] [facility], [address] [place] [address]    Office [phone],QCD [phone]           Maricarmen Webber is a 69 y.o. male  Presents to office for f/up evaluation      Assessment/Recommendations:      ICD-10-CM ICD-9-CM    1. Coronary artery disease involving native coronary artery of native heart without angina pectoris  I25.10 414.01 AMB POC EKG ROUTINE W/ 12 LEADS, INTER & REP      2. History of myocardial infarction  I25.2 412       3. Essential hypertension  I10 401.9       4. Type 2 diabetes mellitus with other specified complication, without long-term current use of insulin (HCC)  E11.69 250.80       5. Hyperlipidemia, unspecified hyperlipidemia type  E78.5 272.4         Dyspnea on exertion with increased chronic fatigue. Concern for possible anemia. Recommend to obtain CBC. If cell counts are normal then we will proceed with exercise stress echo to exclude ischemia        Coronary artery disease, NSTEMI 7/22/21. · Acute coronary syndrome with critical stenosis of mid LAD with evidence of severe stenosis of AV groove circumflex with moderate disease of the ostial right coronary artery  · Mid LAD stented with 3.0 x 23 mm Xience Nandini QUINN optimized with 3.5 noncompliant balloon  · AV groove circumflex stented with 3.5X 23 mm Xience Nandini QUINN, optimized with 3.75 noncompliant balloon    -Continue asa 81mg, hold if he has evidence of iron deficiency anemia.  -Continue lisinopril 10 mg daily  -Continue metoprolol succinate 25 mg daily  -Continue atorvastatin 80 mg daily. LDL improved to 19 with statin therapy. Hyperlipidemia-continue statin therapy. Lipids 5/2022 shows total cholesterol of 85, triglycerides 80, HDL 41, LDL 28.        HFrEF, moderate LV dysfunction at the time of acute myocardial infarction 7/21. LVEF normalized with revascularization and medical therapy.      - Continue goal-directed medical therapy with ACE inhibitor and beta-blocker therapy. N6SH-S0 0.8%. We will commence Jardiance 10 mg daily and chest pain or chest pressure symptoms. He reports having progressive exertional dyspnea. Primary Care Physician- Anat Tompkins, DO    Follow-up 2 months      Subjective:  69 y.o. presents the office for follow-up visit. CAD hx  He presented to the ED 7/21 with acute onset of chest pain. Found to have critical stenosis within mid LAD along with severe stenosis in proximal circumflex. Underwent PCI of his mid LAD along with proximal circumflex. Also has moderate ostial right coronary artery disease. Echocardiogram showed moderate reduced LVEF 35 to 40% with wall motion abnormalities within the LAD territory. Reports progressive exertional dyspnea with severe fatigue. No chest pain symptoms. No obvious gastrointestinal bleeding. Current Outpatient Medications:     metoprolol succinate (TOPROL-XL) 25 mg XL tablet, TAKE 1 TABLET BY MOUTH EVERY DAY, Disp: 90 Tablet, Rfl: 1    atorvastatin (LIPITOR) 80 mg tablet, TAKE 1 TABLET BY MOUTH EVERY DAY AT NIGHT, Disp: 90 Tablet, Rfl: 1    lisinopriL (PRINIVIL, ZESTRIL) 10 mg tablet, TAKE 1 TABLET BY MOUTH EVERY DAY, Disp: 90 Tablet, Rfl: 1    aspirin 81 mg chewable tablet, Take 1 Tablet by mouth daily. , Disp: 1 Tablet, Rfl: 1    Allergies   Allergen Reactions    Keflex [Cephalexin] Rash        Family History   Problem Relation Age of Onset    Cancer Mother     Hypertension Father     Cancer Father        Social History     Tobacco Use    Smoking status: Never    Smokeless tobacco: Never   Vaping Use    Vaping Use: Never used   Substance Use Topics    Alcohol use:  Yes     Alcohol/week: 2.0 standard drinks     Types: 1 Glasses of wine, 1 Cans of beer per week     Comment: daily    Drug use: Never       Review of Symptoms:  Pertinent Positive: neg  Pertinent Negative:No chest pain, dyspnea on exertion, shortness of breath, orthopnea, PND    All Other systems reviewed and are negative for a Comprehensive ROS (10+)    Physical Exam    Blood pressure 136/74, pulse 70, height 5' 11\" (1.803 m), weight 201 lb (91.2 kg), SpO2 92 %. Constitutional:  well-developed and well-nourished. No distress. HENT: Normocephalic. Eyes: No scleral icterus. Neck:  Neck supple. No JVD present. Pulmonary/Chest: Effort normal and breath sounds normal. No respiratory distress, wheezes or rales. Cardiovascular: Normal rate, regular rhythm, S1 S2 . Exam reveals no gallop and no friction rub. No murmur heard. No edema. Extremities:  Normal muscle tone  Abdominal:   No abnormal distension. Neurological:  Moving all extremities, cranial nerves appear grossly intact. Skin: Skin is not cold. Not diaphoretic. No erythema. Psychiatric:  Grossly normal mood and affect. Intact insight. Objective Data: Investigations personally reviewed and interpreted            Investigations reviewed     07/23/21    CARDIAC PROCEDURE 07/25/2021 7/25/2021  Findings:  L Main: Large caliber vessel, short, mild disease  LAD: Large caliber vessel, proximal mild disease, mid vessel hazy 90% flow-limiting stenosis with subsequent distal 40% stenosis, small D1, moderate D2 with diffuse mild to moderate disease  LCx: Large caliber vessel, AV groove circumflex 70 to 80% stenosis, several high small caliber obtuse marginals, OM2 with a proximal tubular 50 to 60% stenosis, OM 3 diffuse mild disease  RCA: Large-caliber vessel, ostial 60% stenosis, diffuse mild disease throughout right coronary artery, moderate distal right coronary artery disease, small PL branch and small PDA with diffuse mild disease    LVEDP:  5 mmhg    PCI:  EBU 3 5, 6F  Systemic heparinization    LAD  Jorge blue passed into distal LAD. Mid LAD dilated with 2.5 compliant balloon. IVUS used for sizing.   Mid LAD just distal to D2 was stented with a 3.0 x 23 mm Xience Nandini UQINN, optimized with 3.5 noncompliant balloon at 16 to 20 fadi.  IVUS showed well opposed stent. No evidence of dissection or perforation. JAQUI-3 flow into distal vessel post PCI    Circumflex  Jorge blue remained in the LAD. Run-through was passed into distal circumflex. Circumflex was dilated with 3.5 noncompliant balloon. IVUS was used for vessel sizing. AV groove circumflex was stented with a 3.5X 23 mm Xience Nandini QUINN. Stent was optimized with a 3.75 NC balloon at 16 to 20 fadi. Required stuart wire in order to pass noncompliant balloon into circumflex. No evidence of dissection or perforation. JAQUI-3 flow into distal circumflex post PCI. ECHO ADULT COMPLETE 07/23/2021    Interpretation Summary  · LV: Estimated LVEF is 35 - 40%. Normal cavity size. Upper normal wall thickness. Moderately and segmentally reduced systolic function. Left ventricular diastolic dysfunction. · AV: Aortic valve leaflet calcification present without reduced excursion. Mild aortic valve regurgitation is present. · MV: Mitral valve leaflet calcification. · RA: Mildly dilated right atrium. 09/20/21    ECHO ADULT FOLLOW-UP OR LIMITED 09/21/2021 9/21/2021    Interpretation Summary  · LV: Calculated LVEF is 59%. Biplane method used to measure ejection fraction. Normal cavity size, wall thickness and systolic function (ejection fraction normal). · Previously seen wall motion abnormality of the anteroapical wall has now recovered and global LVEF has improved from 40% to now 59%. · Limited, focused follow-up of LV systolic function    Signed by: Candance Magnus, MD on 9/21/2021  9:42 PM    09/20/21    NUCLEAR CARDIAC STRESS TEST 09/20/2021 10/4/2021    Interpretation Summary  · SPECT: Left ventricular function post-stress was normal. Calculated ejection fraction is 63%. There is no evidence of transient ischemic dilation (TID). · Baseline ECG: Normal sinus rhythm.   · Stress test: Negative stress test.  · SPECT: Left ventricular perfusion is probably normal. Myocardial perfusion imaging supports a low risk stress test.    Signed by: Lashonda Chan MD on 9/20/2021  5:37 PM            [address] Arita, DO          ATTENTION:   This medical record was transcribed using an electronic medical records/speech recognition system. Although proofread, it may and can contain electronic, spelling and other errors. Corrections may be executed at a later time. Please feel free to contact us for any clarifications as needed.

## 2023-01-06 NOTE — PROGRESS NOTES
Annalise Cohen is a 69 y.o. male    Chief Complaint   Patient presents with    Follow-up     STEMI       Vitals:    01/06/23 1129   BP: 136/74   BP 1 Location: Left upper arm   BP Patient Position: Sitting   Pulse: 70   Height: 5' 11\" (1.803 m)   Weight: 201 lb (91.2 kg)   SpO2: 92%       Chest pain no    SOB with walking has been getting worse     Dizziness no    Swelling some in his legs     Refills no      1. Have you been to the ER, urgent care clinic since your last visit? Hospitalized since your last visit? No    2. Have you seen or consulted any other health care providers outside of the [address] [place] [address] since your last visit? Include any pap smears or colon screening.  No

## 2023-01-11 RX ORDER — LISINOPRIL 10 MG/1
TABLET ORAL
Qty: 90 TABLET | Refills: 1 | Status: SHIPPED | OUTPATIENT
Start: 2023-01-11

## 2023-02-03 RX ORDER — METOPROLOL SUCCINATE 25 MG/1
TABLET, EXTENDED RELEASE ORAL
Qty: 90 TABLET | Refills: 1 | Status: SHIPPED | OUTPATIENT
Start: 2023-02-03

## 2023-02-03 RX ORDER — ATORVASTATIN CALCIUM 80 MG/1
TABLET, FILM COATED ORAL
Qty: 90 TABLET | Refills: 1 | Status: SHIPPED | OUTPATIENT
Start: 2023-02-03

## 2023-05-19 LAB — HBA1C MFR BLD HPLC: 6.6 %

## 2023-05-26 ENCOUNTER — OFFICE VISIT (OUTPATIENT)
Age: 78
End: 2023-05-26
Payer: MEDICARE

## 2023-05-26 VITALS
WEIGHT: 196 LBS | DIASTOLIC BLOOD PRESSURE: 70 MMHG | HEART RATE: 70 BPM | HEIGHT: 71 IN | BODY MASS INDEX: 27.44 KG/M2 | SYSTOLIC BLOOD PRESSURE: 114 MMHG | OXYGEN SATURATION: 95 %

## 2023-05-26 DIAGNOSIS — I10 ESSENTIAL (PRIMARY) HYPERTENSION: ICD-10-CM

## 2023-05-26 DIAGNOSIS — I25.10 ATHEROSCLEROSIS OF NATIVE CORONARY ARTERY OF NATIVE HEART WITHOUT ANGINA PECTORIS: Primary | ICD-10-CM

## 2023-05-26 DIAGNOSIS — Z79.4 TYPE 2 DIABETES MELLITUS WITHOUT COMPLICATION, WITH LONG-TERM CURRENT USE OF INSULIN (HCC): ICD-10-CM

## 2023-05-26 DIAGNOSIS — E11.9 TYPE 2 DIABETES MELLITUS WITHOUT COMPLICATION, WITH LONG-TERM CURRENT USE OF INSULIN (HCC): ICD-10-CM

## 2023-05-26 PROBLEM — I25.118 ATHEROSCLEROTIC HEART DISEASE OF NATIVE CORONARY ARTERY WITH OTHER FORMS OF ANGINA PECTORIS (HCC): Status: ACTIVE | Noted: 2023-05-26

## 2023-05-26 PROCEDURE — 1123F ACP DISCUSS/DSCN MKR DOCD: CPT | Performed by: STUDENT IN AN ORGANIZED HEALTH CARE EDUCATION/TRAINING PROGRAM

## 2023-05-26 PROCEDURE — 3074F SYST BP LT 130 MM HG: CPT | Performed by: STUDENT IN AN ORGANIZED HEALTH CARE EDUCATION/TRAINING PROGRAM

## 2023-05-26 PROCEDURE — G8419 CALC BMI OUT NRM PARAM NOF/U: HCPCS | Performed by: STUDENT IN AN ORGANIZED HEALTH CARE EDUCATION/TRAINING PROGRAM

## 2023-05-26 PROCEDURE — 3078F DIAST BP <80 MM HG: CPT | Performed by: STUDENT IN AN ORGANIZED HEALTH CARE EDUCATION/TRAINING PROGRAM

## 2023-05-26 PROCEDURE — 99214 OFFICE O/P EST MOD 30 MIN: CPT | Performed by: STUDENT IN AN ORGANIZED HEALTH CARE EDUCATION/TRAINING PROGRAM

## 2023-05-26 PROCEDURE — 1036F TOBACCO NON-USER: CPT | Performed by: STUDENT IN AN ORGANIZED HEALTH CARE EDUCATION/TRAINING PROGRAM

## 2023-05-26 PROCEDURE — G8428 CUR MEDS NOT DOCUMENT: HCPCS | Performed by: STUDENT IN AN ORGANIZED HEALTH CARE EDUCATION/TRAINING PROGRAM

## 2023-05-26 RX ORDER — FERROUS SULFATE 325(65) MG
1 TABLET ORAL EVERY OTHER DAY
COMMUNITY
Start: 2023-03-24

## 2023-05-26 NOTE — PROGRESS NOTES
Chief Complaint   Patient presents with    Follow-up     6 mo      Vitals:    05/26/23 1042   BP: 114/70   Site: Left Upper Arm   Position: Sitting   Pulse: 70   SpO2: 95%   Weight: 196 lb (88.9 kg)   Height: 5' 11\" (1.803 m)         Chest pain denied     SOB denied     Palpitations denied     Swelling in hands/feet denied     Dizziness denied     Recent hospital stays denied     Refills denied
diaphoretic. No erythema. Psychiatric:  Grossly normal mood and affect. Intact insight. Objective Data: Investigations personally reviewed and interpreted                  Investigations reviewed       07/23/21   CARDIAC PROCEDURE 07/25/2021 7/25/2021   Findings:   L Main: Large caliber vessel, short, mild disease   LAD: Large caliber vessel, proximal mild disease, mid vessel hazy 90% flow-limiting stenosis with subsequent distal 40% stenosis, small D1, moderate D2 with diffuse mild to moderate disease   LCx: Large caliber vessel, AV groove circumflex 70 to 80% stenosis, several high small caliber obtuse marginals, OM2 with a proximal tubular 50 to 60% stenosis, OM 3 diffuse mild disease   RCA: Large-caliber vessel, ostial 60% stenosis, diffuse mild disease throughout right coronary artery, moderate distal right coronary artery disease, small PL branch and small PDA with diffuse mild disease      LVEDP:   5 mmhg      PCI:   EBU 3 5, 6F   Systemic heparinization      LAD   Dre blue passed into distal LAD. Mid LAD dilated with 2.5 compliant balloon. IVUS used for sizing. Mid LAD just distal to D2 was stented with a 3.0 x 23 mm Xience Debra LIZZ, optimized with 3.5 noncompliant balloon at 16 to 20 trever. IVUS showed well  opposed stent. No evidence of dissection or perforation. PHILL-3 flow into distal vessel post PCI      Circumflex   Dre blue remained in the LAD. Run-through was passed into distal circumflex. Circumflex was dilated with 3.5 noncompliant balloon. IVUS was used for vessel sizing. AV groove circumflex was stented with a 3.5X 23 mm Xience Debra LIZZ. Stent was optimized  with a 3.75 NC balloon at 16 to 20 trever. Required nadine wire in order to pass noncompliant balloon into circumflex. No evidence of dissection or perforation. PHILL-3 flow into distal circumflex post PCI.                   ECHO ADULT COMPLETE 07/23/2021      Interpretation Summary   · LV: Estimated LVEF is 35 -

## 2023-07-10 RX ORDER — LISINOPRIL 10 MG/1
TABLET ORAL
Qty: 90 TABLET | Refills: 3 | Status: SHIPPED | OUTPATIENT
Start: 2023-07-10

## 2023-07-10 NOTE — TELEPHONE ENCOUNTER
Refill per VO of Dr. Charles Rene.  Franchesca Free:    Last appt:  5/26/2023    Future Appointments   Date Time Provider [address] [place] [address]   11/20/2023 10:40 AM DO LATISHA Denton AMB       Requested Prescriptions     Pending Prescriptions Disp Refills    lisinopril (PRINIVIL;ZESTRIL) 10 MG tablet [Pharmacy Med Name: LISINOPRIL 10 MG TABLET] 90 tablet 1     Sig: TAKE 1 TABLET BY MOUTH EVERY DAY

## 2023-07-25 RX ORDER — EMPAGLIFLOZIN 10 MG/1
TABLET, FILM COATED ORAL
Qty: 90 TABLET | Refills: 3 | Status: SHIPPED | OUTPATIENT
Start: 2023-07-25

## 2023-07-25 NOTE — TELEPHONE ENCOUNTER
Refill per VO of Dr. Karon Boateng.  Laure Comfort:    Last appt:  5/26/2023    Future Appointments   Date Time Provider [address]   11/20/2023 10:40 AM DO LATISHA Leblanc BS AMB       Requested Prescriptions     Pending Prescriptions Disp Refills    JARDIANCE 10 MG tablet [Pharmacy Med Name: Kimi Bitters 10 MG TABLET] 90 tablet 1     Sig: TAKE 1 TABLET BY MOUTH EVERY DAY

## 2023-08-02 RX ORDER — METOPROLOL SUCCINATE 25 MG/1
TABLET, EXTENDED RELEASE ORAL
Qty: 90 TABLET | Refills: 3 | Status: SHIPPED | OUTPATIENT
Start: 2023-08-02

## 2023-08-02 RX ORDER — ATORVASTATIN CALCIUM 80 MG/1
TABLET, FILM COATED ORAL
Qty: 90 TABLET | Refills: 3 | Status: SHIPPED | OUTPATIENT
Start: 2023-08-02

## 2023-08-02 NOTE — TELEPHONE ENCOUNTER
Per verbal order from Dr. Kendra Weber  Last appt: 5/26/2023     Future Appointments   Date Time Provider [address]   11/20/2023 10:40 AM DO LATISHA Reddy AMB       Requested Prescriptions     Signed Prescriptions Disp Refills    atorvastatin (LIPITOR) 80 MG tablet 90 tablet 3     Sig: TAKE 1 TABLET BY MOUTH EVERY DAY AT NIGHT     Authorizing Provider: Sandra Jovel     Ordering User: MARTIN DOWNEY    metoprolol succinate (TOPROL XL) 25 MG extended release tablet 90 tablet 3     Sig: TAKE 1 TABLET BY MOUTH EVERY DAY     Authorizing Provider: Sandra Jovel     Ordering User: Nanda DOWNEY

## 2023-11-20 ENCOUNTER — OFFICE VISIT (OUTPATIENT)
Age: 78
End: 2023-11-20
Payer: MEDICARE

## 2023-11-20 VITALS
HEIGHT: 71 IN | OXYGEN SATURATION: 98 % | HEART RATE: 88 BPM | DIASTOLIC BLOOD PRESSURE: 62 MMHG | WEIGHT: 192 LBS | SYSTOLIC BLOOD PRESSURE: 120 MMHG | BODY MASS INDEX: 26.88 KG/M2

## 2023-11-20 DIAGNOSIS — Z79.4 TYPE 2 DIABETES MELLITUS WITHOUT COMPLICATION, WITH LONG-TERM CURRENT USE OF INSULIN (HCC): ICD-10-CM

## 2023-11-20 DIAGNOSIS — I25.10 ATHEROSCLEROSIS OF NATIVE CORONARY ARTERY OF NATIVE HEART WITHOUT ANGINA PECTORIS: Primary | ICD-10-CM

## 2023-11-20 DIAGNOSIS — E11.9 TYPE 2 DIABETES MELLITUS WITHOUT COMPLICATION, WITH LONG-TERM CURRENT USE OF INSULIN (HCC): ICD-10-CM

## 2023-11-20 DIAGNOSIS — I10 ESSENTIAL (PRIMARY) HYPERTENSION: ICD-10-CM

## 2023-11-20 DIAGNOSIS — I25.2 HISTORY OF MYOCARDIAL INFARCTION: ICD-10-CM

## 2023-11-20 PROCEDURE — G8428 CUR MEDS NOT DOCUMENT: HCPCS | Performed by: STUDENT IN AN ORGANIZED HEALTH CARE EDUCATION/TRAINING PROGRAM

## 2023-11-20 PROCEDURE — 99214 OFFICE O/P EST MOD 30 MIN: CPT | Performed by: STUDENT IN AN ORGANIZED HEALTH CARE EDUCATION/TRAINING PROGRAM

## 2023-11-20 PROCEDURE — 3074F SYST BP LT 130 MM HG: CPT | Performed by: STUDENT IN AN ORGANIZED HEALTH CARE EDUCATION/TRAINING PROGRAM

## 2023-11-20 PROCEDURE — G8419 CALC BMI OUT NRM PARAM NOF/U: HCPCS | Performed by: STUDENT IN AN ORGANIZED HEALTH CARE EDUCATION/TRAINING PROGRAM

## 2023-11-20 PROCEDURE — 1123F ACP DISCUSS/DSCN MKR DOCD: CPT | Performed by: STUDENT IN AN ORGANIZED HEALTH CARE EDUCATION/TRAINING PROGRAM

## 2023-11-20 PROCEDURE — 3078F DIAST BP <80 MM HG: CPT | Performed by: STUDENT IN AN ORGANIZED HEALTH CARE EDUCATION/TRAINING PROGRAM

## 2023-11-20 PROCEDURE — G8484 FLU IMMUNIZE NO ADMIN: HCPCS | Performed by: STUDENT IN AN ORGANIZED HEALTH CARE EDUCATION/TRAINING PROGRAM

## 2023-11-20 PROCEDURE — 1036F TOBACCO NON-USER: CPT | Performed by: STUDENT IN AN ORGANIZED HEALTH CARE EDUCATION/TRAINING PROGRAM

## 2023-11-20 NOTE — PROGRESS NOTES
Chief Complaint   Patient presents with    Follow-up     6 mo     Coronary Artery Disease    Hypertension     Vitals:    11/20/23 1035   BP: 120/62   Site: Left Upper Arm   Position: Sitting   Pulse: 88   SpO2: 98%   Weight: 87.1 kg (192 lb)   Height: 1.803 m (5' 11\")         Chest pain denied     SOB denied     Palpitations denied     Swelling in hands/feet denied     Dizziness denied     Recent hospital stays denied     Refills denied

## 2024-04-15 RX ORDER — LISINOPRIL 10 MG/1
10 TABLET ORAL DAILY
Qty: 90 TABLET | Refills: 2 | Status: SHIPPED | OUTPATIENT
Start: 2024-04-15

## 2024-04-15 NOTE — TELEPHONE ENCOUNTER
Refill per VO of Dr. Rivera  Last appt: 11/20/2023    Future Appointments   Date Time Provider Department Center   11/22/2024 10:20 AM Alber Rivera, DO [facility] BS AMB       Requested Prescriptions     Pending Prescriptions Disp Refills    lisinopril (PRINIVIL;ZESTRIL) 10 MG tablet 90 tablet 3     Sig: Take 1 tablet by mouth daily

## 2024-07-08 RX ORDER — METOPROLOL SUCCINATE 25 MG/1
TABLET, EXTENDED RELEASE ORAL
Qty: 90 TABLET | Refills: 1 | Status: SHIPPED | OUTPATIENT
Start: 2024-07-08

## 2024-07-08 RX ORDER — ATORVASTATIN CALCIUM 80 MG/1
TABLET, FILM COATED ORAL
Qty: 90 TABLET | Refills: 1 | Status: SHIPPED | OUTPATIENT
Start: 2024-07-08

## 2024-07-08 NOTE — TELEPHONE ENCOUNTER
Refill per VO of Dr. JANETTE Rivera, OD:    Last appt:  11/20/2023    Future Appointments   Date Time Provider Department Center   11/22/2024 10:20 AM Alber Rivera DO [facility]       Requested Prescriptions     Pending Prescriptions Disp Refills    atorvastatin (LIPITOR) 80 MG tablet [Pharmacy Med Name: ATORVASTATIN 80 MG TABLET] 90 tablet 3     Sig: TAKE 1 TABLET BY MOUTH EVERY DAY AT NIGHT    metoprolol succinate (TOPROL XL) 25 MG extended release tablet [Pharmacy Med Name: METOPROLOL SUCC ER 25 MG TAB] 90 tablet 3     Sig: TAKE 1 TABLET BY MOUTH EVERY DAY

## 2024-09-26 RX ORDER — LISINOPRIL 10 MG/1
10 TABLET ORAL DAILY
Qty: 90 TABLET | Refills: 0 | Status: SHIPPED | OUTPATIENT
Start: 2024-09-26

## 2024-10-07 RX ORDER — ATORVASTATIN CALCIUM 80 MG/1
TABLET, FILM COATED ORAL
Qty: 90 TABLET | Refills: 0 | Status: SHIPPED | OUTPATIENT
Start: 2024-10-07

## 2024-10-07 RX ORDER — METOPROLOL SUCCINATE 25 MG/1
TABLET, EXTENDED RELEASE ORAL
Qty: 90 TABLET | Refills: 0 | Status: SHIPPED | OUTPATIENT
Start: 2024-10-07

## 2024-10-24 ENCOUNTER — TELEPHONE (OUTPATIENT)
Age: 79
End: 2024-10-24

## 2024-10-24 NOTE — TELEPHONE ENCOUNTER
Good morning, patient appointment has been rescheduled multiple times and he's upset. I cannot find anything until next year. Can you help, please? He said he hit the end button on the phone accidentally. There's no appointments with the NP either.    Patient # [phone]

## 2024-12-09 ENCOUNTER — OFFICE VISIT (OUTPATIENT)
Age: 79
End: 2024-12-09
Payer: MEDICARE

## 2024-12-09 VITALS
OXYGEN SATURATION: 94 % | BODY MASS INDEX: 28.84 KG/M2 | HEART RATE: 85 BPM | DIASTOLIC BLOOD PRESSURE: 72 MMHG | HEIGHT: 71 IN | WEIGHT: 206 LBS | SYSTOLIC BLOOD PRESSURE: 132 MMHG

## 2024-12-09 DIAGNOSIS — Z79.4 TYPE 2 DIABETES MELLITUS WITHOUT COMPLICATION, WITH LONG-TERM CURRENT USE OF INSULIN (HCC): ICD-10-CM

## 2024-12-09 DIAGNOSIS — I10 ESSENTIAL (PRIMARY) HYPERTENSION: ICD-10-CM

## 2024-12-09 DIAGNOSIS — E11.9 TYPE 2 DIABETES MELLITUS WITHOUT COMPLICATION, WITH LONG-TERM CURRENT USE OF INSULIN (HCC): ICD-10-CM

## 2024-12-09 DIAGNOSIS — I25.10 ATHEROSCLEROSIS OF NATIVE CORONARY ARTERY OF NATIVE HEART WITHOUT ANGINA PECTORIS: Primary | ICD-10-CM

## 2024-12-09 PROCEDURE — 99214 OFFICE O/P EST MOD 30 MIN: CPT | Performed by: STUDENT IN AN ORGANIZED HEALTH CARE EDUCATION/TRAINING PROGRAM

## 2024-12-09 PROCEDURE — 3075F SYST BP GE 130 - 139MM HG: CPT | Performed by: STUDENT IN AN ORGANIZED HEALTH CARE EDUCATION/TRAINING PROGRAM

## 2024-12-09 PROCEDURE — 1159F MED LIST DOCD IN RCRD: CPT | Performed by: STUDENT IN AN ORGANIZED HEALTH CARE EDUCATION/TRAINING PROGRAM

## 2024-12-09 PROCEDURE — 93010 ELECTROCARDIOGRAM REPORT: CPT | Performed by: STUDENT IN AN ORGANIZED HEALTH CARE EDUCATION/TRAINING PROGRAM

## 2024-12-09 PROCEDURE — 93005 ELECTROCARDIOGRAM TRACING: CPT | Performed by: STUDENT IN AN ORGANIZED HEALTH CARE EDUCATION/TRAINING PROGRAM

## 2024-12-09 PROCEDURE — 1036F TOBACCO NON-USER: CPT | Performed by: STUDENT IN AN ORGANIZED HEALTH CARE EDUCATION/TRAINING PROGRAM

## 2024-12-09 PROCEDURE — 1123F ACP DISCUSS/DSCN MKR DOCD: CPT | Performed by: STUDENT IN AN ORGANIZED HEALTH CARE EDUCATION/TRAINING PROGRAM

## 2024-12-09 PROCEDURE — G8484 FLU IMMUNIZE NO ADMIN: HCPCS | Performed by: STUDENT IN AN ORGANIZED HEALTH CARE EDUCATION/TRAINING PROGRAM

## 2024-12-09 PROCEDURE — 1126F AMNT PAIN NOTED NONE PRSNT: CPT | Performed by: STUDENT IN AN ORGANIZED HEALTH CARE EDUCATION/TRAINING PROGRAM

## 2024-12-09 PROCEDURE — G8427 DOCREV CUR MEDS BY ELIG CLIN: HCPCS | Performed by: STUDENT IN AN ORGANIZED HEALTH CARE EDUCATION/TRAINING PROGRAM

## 2024-12-09 PROCEDURE — G8419 CALC BMI OUT NRM PARAM NOF/U: HCPCS | Performed by: STUDENT IN AN ORGANIZED HEALTH CARE EDUCATION/TRAINING PROGRAM

## 2024-12-09 PROCEDURE — 3078F DIAST BP <80 MM HG: CPT | Performed by: STUDENT IN AN ORGANIZED HEALTH CARE EDUCATION/TRAINING PROGRAM

## 2024-12-09 RX ORDER — ATORVASTATIN CALCIUM 40 MG/1
40 TABLET, FILM COATED ORAL NIGHTLY
Qty: 90 TABLET | Refills: 3 | Status: SHIPPED | OUTPATIENT
Start: 2024-12-09

## 2024-12-09 NOTE — PROGRESS NOTES
[place]   [address] [facility], [address]   [place] [address]      Office [phone],Fax [phone]                Jon Murdock is a 79 y.o. male  presents to office for f/up evaluation         Assessment/Recommendations:         Diagnosis Orders   1. Atherosclerosis of native coronary artery of native heart without angina pectoris        2. Essential (primary) hypertension  EKG 12 Lead      3. Type 2 diabetes mellitus without complication, with long-term current use of insulin (HCC)                        Coronary artery disease, NSTEMI 7/22/21.   · Acute coronary syndrome with critical stenosis of mid LAD with evidence of severe stenosis of AV groove circumflex with moderate disease of the ostial right coronary  artery   · Mid LAD stented with 3.0 x 23 mm Xience Debra LIZZ optimized with 3.5 noncompliant balloon   · AV groove circumflex stented with 3.5X 23 mm Xience Debra LIZZ, optimized with 3.75 noncompliant balloon      -asa 81mg   -lisinopril 10 mg daily   -metoprolol succinate 25 mg daily   -Patient requesting trial of decrease his atorvastatin from 80 to 40 mg.  He feels his A1c increased significantly with prior titration of his statin with his acute myocardial infarction.  Will take his atorvastatin to 40 mg daily with repeat hemoglobin A1c lipids and CMP in the February 2025.       Hyperlipidemia-continue statin therapy. LDL 30 on atorvastatin 80mg, changes per above      HFrEF, moderate LV dysfunction at the time of acute myocardial infarction 7/21.  LVEF normalized with revascularization and medical therapy.      - Continue goal-directed medical therapy with ACE inhibitor  and beta-blocker therapy.            T2DM- off jardiance due to cost.  A1c 6.6%, continues on burburine.  Trial decreasing statin therapy and repeat A1c          Microcytic anemia 1/2023.  Upper endoscopy, colonoscopy and capsule endoscopy without etiology of microcytic anemia.  Hemoglobin and

## 2024-12-09 NOTE — PROGRESS NOTES
had concerns including Coronary Artery Disease and Hypertension.    Vitals:    12/09/24 0922   BP: 132/72   Site: Left Upper Arm   Position: Sitting   Pulse: 85   SpO2: 94%   Weight: 93.4 kg (206 lb)   Height: 1.803 m (5' 11\")        Chest pain No    Refills No        1. Have you been to the ER, urgent care clinic since your last visit? No       Hospitalized since your last visit? No       Where?        Date?

## 2024-12-23 RX ORDER — LISINOPRIL 10 MG/1
10 TABLET ORAL DAILY
Qty: 90 TABLET | Refills: 3 | Status: SHIPPED | OUTPATIENT
Start: 2024-12-23

## 2025-03-26 RX ORDER — METOPROLOL SUCCINATE 25 MG/1
25 TABLET, EXTENDED RELEASE ORAL DAILY
Qty: 90 TABLET | Refills: 2 | Status: SHIPPED | OUTPATIENT
Start: 2025-03-26

## 2025-03-26 NOTE — TELEPHONE ENCOUNTER
Refill per VO of Dr. JANETTE Rivera, OD:    Last appt:  12/9/2024    Future Appointments   Date Time Provider Department Center   12/9/2025 11:20 AM Alber Rivera DO [facility]       Requested Prescriptions     Pending Prescriptions Disp Refills    metoprolol succinate (TOPROL XL) 25 MG extended release tablet [Pharmacy Med Name: METOPROLOL SUCC ER 25 MG TAB] 90 tablet 0     Sig: TAKE 1 TABLET BY MOUTH EVERY DAY

## (undated) DEVICE — SWAN-GANZ TRUE SIZE THERMODULTION CATHETER, 5F: Brand: SWAN-GANZ TRUE SIZE

## (undated) DEVICE — PROCEDURE KIT FLUID MGMT CUST MAINFOLD STRL

## (undated) DEVICE — COPILOT BLEEDBACK CONTROL VALVE: Brand: COPILOT

## (undated) DEVICE — Device: Brand: ASAHI SION BLUE

## (undated) DEVICE — INTRODUCER SHTH 5 FRX30 CM 7 CM W/ NIT WIRE REG MICRO-STICK

## (undated) DEVICE — Device: Brand: EAGLE EYE PLATINUM RX DIGITAL IVUS CATHETER

## (undated) DEVICE — CATH GUID COR EB35 6FR 100CM -- LAUNCHER

## (undated) DEVICE — TR BAND RADIAL ARTERY COMPRESSION DEVICE: Brand: TR BAND

## (undated) DEVICE — DEVICE INFL 20ML 30ATM DGT FLD DISPNS SYR W ACCESSPLUS BLU

## (undated) DEVICE — SUPPORT WRST AD W3.5XL9IN DIA14.5IN ART SFT ADJ HK AND LOOP

## (undated) DEVICE — CATH BLLN ANGIO 3.75X8MM NC EUPHORIA RX

## (undated) DEVICE — MEDI-TRACE CADENCE ADULT, DEFIBRILLATION ELECTRODE -RTS  (10 PR/PK) - PHILIPS: Brand: MEDI-TRACE CADENCE

## (undated) DEVICE — GLIDESHEATH SLENDER STAINLESS STEEL KIT: Brand: GLIDESHEATH SLENDER

## (undated) DEVICE — PACK PROCEDURE SURG HRT CATH

## (undated) DEVICE — GUIDE CATH CONVEY 5FR JL3.5 -- 39228-661

## (undated) DEVICE — GUIDE CATH CONVEY 5FR JR4 -- 39228-686

## (undated) DEVICE — CATH ANGI BLLN DIL 3.75X15MM -- NC EUPHORA

## (undated) DEVICE — PINNACLE INTRODUCER SHEATH: Brand: PINNACLE

## (undated) DEVICE — XIENCE SIERRA™ EVEROLIMUS ELUTING CORONARY STENT SYSTEM 3.50 MM X 28 MM / RAPID-EXCHANGE
Type: IMPLANTABLE DEVICE | Status: NON-FUNCTIONAL
Brand: XIENCE SIERRA™

## (undated) DEVICE — CATH ANGI BLLN DIL 3.5X15MM -- NC EUPHORA

## (undated) DEVICE — RUNTHROUGH NS EXTRA FLOPPY PTCA GUIDEWIRE: Brand: RUNTHROUGH

## (undated) DEVICE — SYRINGE MED 10ML RED POLYCARB BRL FIX M LUER CONN FLAT GRP

## (undated) DEVICE — CATHETER BLLN SPRINTER LEGEND RX 142CM L12MM DIA2.5MM GWIRE 0.022IN 8ATM